# Patient Record
Sex: MALE | Race: WHITE | NOT HISPANIC OR LATINO | Employment: UNEMPLOYED | URBAN - METROPOLITAN AREA
[De-identification: names, ages, dates, MRNs, and addresses within clinical notes are randomized per-mention and may not be internally consistent; named-entity substitution may affect disease eponyms.]

---

## 2018-09-28 ENCOUNTER — HOSPITAL ENCOUNTER (EMERGENCY)
Facility: HOSPITAL | Age: 28
Discharge: HOME | End: 2018-09-28

## 2018-09-28 ENCOUNTER — APPOINTMENT (EMERGENCY)
Dept: RADIOLOGY | Facility: HOSPITAL | Age: 28
End: 2018-09-28

## 2018-09-28 VITALS
HEART RATE: 92 BPM | HEIGHT: 70 IN | DIASTOLIC BLOOD PRESSURE: 102 MMHG | WEIGHT: 280 LBS | RESPIRATION RATE: 18 BRPM | OXYGEN SATURATION: 97 % | SYSTOLIC BLOOD PRESSURE: 147 MMHG | BODY MASS INDEX: 40.09 KG/M2 | TEMPERATURE: 97.5 F

## 2018-09-28 DIAGNOSIS — I10 HYPERTENSION, UNSPECIFIED TYPE: ICD-10-CM

## 2018-09-28 DIAGNOSIS — J06.9 UPPER RESPIRATORY TRACT INFECTION: Primary | ICD-10-CM

## 2018-09-28 LAB
ALBUMIN SERPL BCP-MCNC: 4.4 G/DL (ref 3.5–5.7)
ALP SERPL-CCNC: 84 U/L (ref 40–150)
ALT SERPL W P-5'-P-CCNC: 57 U/L (ref 7–52)
ANION GAP SERPL CALCULATED.3IONS-SCNC: 6 MMOL/L (ref 4–13)
AST SERPL W P-5'-P-CCNC: 35 U/L (ref 13–39)
BASOPHILS # BLD AUTO: 0 THOUSANDS/ΜL (ref 0–0.1)
BASOPHILS NFR BLD AUTO: 1 % (ref 0–2)
BILIRUB SERPL-MCNC: 0.4 MG/DL (ref 0.2–1)
BUN SERPL-MCNC: 10 MG/DL (ref 7–25)
CALCIUM SERPL-MCNC: 10 MG/DL (ref 8.6–10.5)
CHLORIDE SERPL-SCNC: 105 MMOL/L (ref 98–107)
CO2 SERPL-SCNC: 29 MMOL/L (ref 21–31)
CREAT SERPL-MCNC: 0.75 MG/DL (ref 0.7–1.3)
EOSINOPHIL # BLD AUTO: 0.1 THOUSAND/ΜL (ref 0–0.61)
EOSINOPHIL NFR BLD AUTO: 1 % (ref 0–5)
ERYTHROCYTE [DISTWIDTH] IN BLOOD BY AUTOMATED COUNT: 13.4 % (ref 11.5–14.5)
GFR SERPL CREATININE-BSD FRML MDRD: 123 ML/MIN/1.73SQ M
GLUCOSE SERPL-MCNC: 104 MG/DL (ref 65–99)
GLUCOSE SERPL-MCNC: 98 MG/DL (ref 65–140)
HCT VFR BLD AUTO: 48 % (ref 36.5–49.3)
HGB BLD-MCNC: 16 G/DL (ref 14–18)
LYMPHOCYTES # BLD AUTO: 2.7 THOUSANDS/ΜL (ref 0.6–4.47)
LYMPHOCYTES NFR BLD AUTO: 35 % (ref 21–51)
MCH RBC QN AUTO: 31.6 PG (ref 26–34)
MCHC RBC AUTO-ENTMCNC: 33.3 G/DL (ref 31–37)
MCV RBC AUTO: 95 FL (ref 81–99)
MONOCYTES # BLD AUTO: 0.6 THOUSAND/ΜL (ref 0.17–1.22)
MONOCYTES NFR BLD AUTO: 8 % (ref 2–12)
NEUTROPHILS # BLD AUTO: 4.3 THOUSANDS/ΜL (ref 1.4–6.5)
NEUTS SEG NFR BLD AUTO: 56 % (ref 42–75)
NRBC BLD AUTO-RTO: 0 /100 WBCS
PLATELET # BLD AUTO: 248 THOUSANDS/UL (ref 149–390)
PMV BLD AUTO: 9.1 FL (ref 8.6–11.7)
POTASSIUM SERPL-SCNC: 4.6 MMOL/L (ref 3.5–5.5)
PROT SERPL-MCNC: 7.5 G/DL (ref 6.4–8.9)
RBC # BLD AUTO: 5.06 MILLION/UL (ref 4.3–5.9)
S PYO AG THROAT QL: NEGATIVE
SODIUM SERPL-SCNC: 140 MMOL/L (ref 134–143)
WBC # BLD AUTO: 7.7 THOUSAND/UL (ref 4.8–10.8)

## 2018-09-28 PROCEDURE — 82948 REAGENT STRIP/BLOOD GLUCOSE: CPT

## 2018-09-28 PROCEDURE — 99283 EMERGENCY DEPT VISIT LOW MDM: CPT

## 2018-09-28 PROCEDURE — 80053 COMPREHEN METABOLIC PANEL: CPT

## 2018-09-28 PROCEDURE — 85025 COMPLETE CBC W/AUTO DIFF WBC: CPT

## 2018-09-28 PROCEDURE — 71046 X-RAY EXAM CHEST 2 VIEWS: CPT

## 2018-09-28 PROCEDURE — 87430 STREP A AG IA: CPT

## 2018-09-28 PROCEDURE — 36415 COLL VENOUS BLD VENIPUNCTURE: CPT

## 2018-09-28 RX ORDER — ALBUTEROL SULFATE 90 UG/1
2 AEROSOL, METERED RESPIRATORY (INHALATION) 4 TIMES DAILY
Qty: 1 INHALER | Refills: 0 | Status: SHIPPED | OUTPATIENT
Start: 2018-09-28 | End: 2018-10-05

## 2018-09-28 NOTE — ED NOTES
Patient transported to X-ray ambulatory (pt request) with radiology staff        Izabella Garcia RN  09/28/18 [id number]

## 2018-09-28 NOTE — ED NOTES
Pt states he wants to be checked for bronchitis and also wishes to be checked for diabetes  "When I eat something like cake it makes my vision funny " Pt states diabetes runs in his family       [place], RN  09/28/18 [id number]

## 2018-09-28 NOTE — DISCHARGE INSTRUCTIONS
Upper Respiratory Infection   WHAT YOU NEED TO KNOW:   An upper respiratory infection is also called the common cold  It is an infection that can affect your nose, throat, ears, and sinuses  For healthy people, the common cold is usually not serious and does not need special treatment  Cold symptoms are usually worst for the first 3 to 5 days  Most people get better in 7 to 14 days  You may continue to cough for 2 to 3 weeks  Colds are caused by viruses and do not get better with antibiotics  DISCHARGE INSTRUCTIONS:   Return to the emergency department if:   · You have chest pain or trouble breathing  Contact your healthcare provider if:   · You have a fever over 102ºF (39°C)  · Your sore throat gets worse or you see white or yellow spots in your throat  · Your symptoms get worse after 3 to 5 days or your cold is not better in 14 days  · You have a rash anywhere on your skin  · You have large, tender lumps in your neck  · You have thick, green or yellow drainage from your nose  · You cough up thick yellow, green, or bloody mucus  · You have vomiting for more than 24 hours and cannot keep fluids down  · You have a bad earache  · You have questions or concerns about your condition or care  Medicines: You may need any of the following:  · Decongestants  help reduce nasal congestion and help you breathe more easily  If you take decongestant pills, they may make you feel restless or cause problems with your sleep  Do not use decongestant sprays for more than a few days  · Cough suppressants  help reduce coughing  Ask your healthcare provider which type of cough medicine is best for you  · NSAIDs , such as ibuprofen, help decrease swelling, pain, and fever  NSAIDs can cause stomach bleeding or kidney problems in certain people  If you take blood thinner medicine, always ask your healthcare provider if NSAIDs are safe for you   Always read the medicine label and follow directions  · Acetaminophen  decreases pain and fever  It is available without a doctor's order  Ask how much to take and how often to take it  Follow directions  Read the labels of all other medicines you are using to see if they also contain acetaminophen, or ask your doctor or pharmacist  Acetaminophen can cause liver damage if not taken correctly  Do not use more than 4 grams (4,000 milligrams) total of acetaminophen in one day  · Take your medicine as directed  Contact your healthcare provider if you think your medicine is not helping or if you have side effects  Tell him or her if you are allergic to any medicine  Keep a list of the medicines, vitamins, and herbs you take  Include the amounts, and when and why you take them  Bring the list or the pill bottles to follow-up visits  Carry your medicine list with you in case of an emergency  Follow up with your healthcare provider as directed:  Write down your questions so you remember to ask them during your visits  Self-care:   · Rest as much as possible  Slowly start to do more each day  · Drink more liquids as directed  Liquids will help thin and loosen mucus so you can cough it up  Liquids will also help prevent dehydration  Liquids that help prevent dehydration include water, fruit juice, and broth  Do not drink liquids that contain caffeine  Caffeine can increase your risk for dehydration  Ask your healthcare provider how much liquid to drink each day  · Soothe a sore throat  Gargle with warm salt water  This helps your sore throat feel better  Make salt water by dissolving ¼ teaspoon salt in 1 cup warm water  You may also suck on hard candy or throat lozenges  You may use a sore throat spray  · Use a humidifier or vaporizer  Use a cool mist humidifier or a vaporizer to increase air moisture in your home  This may make it easier for you to breathe and help decrease your cough  · Use saline nasal drops as directed    These help relieve congestion  · Apply petroleum-based jelly around the outside of your nostrils  This can decrease irritation from blowing your nose  · Do not smoke  Nicotine and other chemicals in cigarettes and cigars can make your symptoms worse  They can also cause infections such as bronchitis or pneumonia  Ask your healthcare provider for information if you currently smoke and need help to quit  E-cigarettes or smokeless tobacco still contain nicotine  Talk to your healthcare provider before you use these products  Prevent spreading your cold to others:   · Try to stay away from other people during the first 2 to 3 days of your cold when it is more easily spread  · Do not share food or drinks  · Do not share hand towels with household members  · Wash your hands often, especially after you blow your nose  Turn away from other people and cover your mouth and nose with a tissue when you sneeze or cough  © 2017 [employer]  Information is for End User's use only and may not be sold, redistributed or otherwise used for commercial purposes  All illustrations and images included in CareNotes® are the copyrighted property of [employer]  or [employer]  The above information is an  only  It is not intended as medical advice for individual conditions or treatments  Talk to your doctor, nurse or pharmacist before following any medical regimen to see if it is safe and effective for you  Upper Respiratory Infection   WHAT YOU NEED TO KNOW:   What is an upper respiratory infection? An upper respiratory infection is also called a common cold  It can affect your nose, throat, ears, and sinuses  What causes a cold? The common cold is caused by a virus  There are many different cold viruses, and each is contagious  This means the virus can be easily spread to another person when the sick person coughs or sneezes   The virus can also be spread if you touch something that a person with a cold has touched  You are more likely to get a cold in the winter  Your risk of getting a cold may be increased if you smoke cigarettes or have allergies, such as hay fever  What are the signs and symptoms of a cold? Cold symptoms are usually worst for the first 3 to 5 days  You may have any of the following:  · Runny or stuffy nose    · Sneezing and coughing    · Sore throat or hoarseness    · Red, watery, and sore eyes    · Fatigue     · Chills and fever    · Headache, body aches, or sore muscles  How is a cold treated? There is no cure for the common cold  Colds are caused by viruses and do not get better with antibiotics  Most people get better in 7 to 14 days  You may continue to cough for 2 to 3 weeks  The following may help decrease your symptoms:  · Decongestants  help reduce nasal congestion and help you breathe more easily  If you take decongestant pills, they may make you feel restless or cause problems with your sleep  Do not use decongestant sprays for more than a few days  · Cough suppressants  help reduce coughing  Ask your healthcare provider which type of cough medicine is best for you  · NSAIDs , such as ibuprofen, help decrease swelling, pain, and fever  NSAIDs can cause stomach bleeding or kidney problems in certain people  If you take blood thinner medicine, always ask your healthcare provider if NSAIDs are safe for you  Always read the medicine label and follow directions  · Acetaminophen  decreases pain and fever  It is available without a doctor's order  Ask how much to take and how often to take it  Follow directions  Read the labels of all other medicines you are using to see if they also contain acetaminophen, or ask your doctor or pharmacist  Acetaminophen can cause liver damage if not taken correctly  Do not use more than 4 grams (4,000 milligrams) total of acetaminophen in one day  How can I manage my cold? · Rest as much as possible  Slowly start to do more each day  · Drink more liquids as directed  Liquids will help thin and loosen mucus so you can cough it up  Liquids will also help prevent dehydration  Liquids that help prevent dehydration include water, fruit juice, and broth  Do not drink liquids that contain caffeine  Caffeine can increase your risk for dehydration  Ask your healthcare provider how much liquid to drink each day  · Soothe a sore throat  Gargle with warm salt water  This helps your sore throat feel better  Make salt water by dissolving ¼ teaspoon salt in 1 cup warm water  You may also suck on hard candy or throat lozenges  You may use a sore throat spray  · Use a humidifier or vaporizer  Use a cool mist humidifier or a vaporizer to increase air moisture in your home  This may make it easier for you to breathe and help decrease your cough  · Use saline nasal drops as directed  These help relieve congestion  · Apply petroleum-based jelly around the outside of your nostrils  This can decrease irritation from blowing your nose  · Do not smoke  Nicotine and other chemicals in cigarettes and cigars can make your symptoms worse  They can also cause infections such as bronchitis or pneumonia  Ask your healthcare provider for information if you currently smoke and need help to quit  E-cigarettes or smokeless tobacco still contain nicotine  Talk to your healthcare provider before you use these products  What can I do to prevent the spread of the common cold? · Try to stay away from other people during the first 2 to 3 days of your cold when it is more easily spread  · Do not share food or drinks  · Do not share hand towels with household members  · Wash your hands often, especially after you blow your nose  Turn away from other people and cover your mouth and nose with a tissue when you sneeze or cough  When should I seek immediate care?    · You have chest pain or trouble breathing  When should I contact my healthcare provider? · You have a fever over 102ºF (39ºC)  · Your sore throat gets worse or you see white or yellow spots in your throat  · Your symptoms get worse after 3 to 5 days or your cold is not better in 14 days  · You have a rash anywhere on your skin  · You have large, tender lumps in your neck  · You have thick, green, or yellow drainage from your nose  · You cough up thick yellow, green, or bloody mucus  · You are vomiting for more than 24 hours and cannot keep fluids down  · You have a bad earache  · You have questions or concerns about your condition or care  CARE AGREEMENT:   You have the right to help plan your care  Learn about your health condition and how it may be treated  Discuss treatment options with your caregivers to decide what care you want to receive  You always have the right to refuse treatment  The above information is an  only  It is not intended as medical advice for individual conditions or treatments  Talk to your doctor, nurse or pharmacist before following any medical regimen to see if it is safe and effective for you  © 2017 [address] [facility] Information is for End User's use only and may not be sold, redistributed or otherwise used for commercial purposes  All illustrations and images included in CareNotes® are the copyrighted property of [employer]  or [employer]  Hypertension   WHAT YOU NEED TO KNOW:   Hypertension is high blood pressure (BP)  Your BP is the force of your blood moving against the walls of your arteries  Normal BP is less than 120/80  Prehypertension is between 120/80 and 139/89  Hypertension is 140/90 or higher  Hypertension causes your BP to get so high that your heart has to work much harder than normal  This can damage your heart  You can control hypertension with a healthy lifestyle or medicines   A controlled blood pressure helps protect your organs, such as your heart, lungs, brain, and kidneys  DISCHARGE INSTRUCTIONS:   Call 911 for any of the following:   · You have discomfort in your chest that feels like squeezing, pressure, fullness, or pain  · You become confused or have difficulty speaking  · You suddenly feel lightheaded or have trouble breathing  · You have pain or discomfort in your back, neck, jaw, stomach, or arm  Return to the emergency department if:   · You have a severe headache or vision loss  · You have weakness in an arm or leg  Contact your healthcare provider if:   · You feel faint, dizzy, confused, or drowsy  · You have been taking your BP medicine and your BP is still higher than your healthcare provider says it should be  · You have questions or concerns about your condition or care  Medicines: You may  need any of the following:  · Medicine  may be used to help lower your BP  You may need more than one type of medicine  Take the medicine exactly as directed  · Diuretics  help decrease extra fluid that collects in your body  This will help lower your BP  You may urinate more often while you take this medicine  · Cholesterol medicine  helps lower your cholesterol level  A low cholesterol level helps prevent heart disease and makes it easier to control your blood pressure  · Take your medicine as directed  Contact your healthcare provider if you think your medicine is not helping or if you have side effects  Tell him or her if you are allergic to any medicine  Keep a list of the medicines, vitamins, and herbs you take  Include the amounts, and when and why you take them  Bring the list or the pill bottles to follow-up visits  Carry your medicine list with you in case of an emergency  Follow up with your healthcare provider as directed: You will need to return to have your BP checked and to have other lab tests done  Write down your questions so you remember to ask them during your visits  Manage hypertension:  Talk with your healthcare provider about these and other ways to manage hypertension:  · Check your BP at home  Sit and rest for 5 minutes before you take your BP  Extend your arm and support it on a flat surface  Your arm should be at the same level as your heart  Follow the directions that came with your BP monitor  If possible, take at least 2 BP readings each time  Take your BP at least twice a day at the same times each day, such as morning and evening  Keep a record of your BP readings and bring it to your follow-up visits  Ask your healthcare provider what your BP should be  · Limit sodium (salt) as directed  Too much sodium can affect your fluid balance  Check labels to find low-sodium or no-salt-added foods  Some low-sodium foods use potassium salts for flavor  Too much potassium can also cause health problems  Your healthcare provider will tell you how much sodium and potassium are safe for you to have in a day  He or she may recommend that you limit sodium to 2,300 mg a day  · Follow the meal plan recommended by your healthcare provider  A dietitian or your provider can give you more information on low-sodium plans or the DASH (Dietary Approaches to Stop Hypertension) eating plan  The DASH plan is low in sodium, unhealthy fats, and total fat  It is high in potassium, calcium, and fiber  · Exercise to maintain a healthy weight  Exercise at least 30 minutes per day, on most days of the week  This will help decrease your blood pressure  Ask your healthcare provider about the best exercise plan for you  · Decrease stress  This may help lower your BP  Learn ways to relax, such as deep breathing or listening to music  · Limit alcohol  Women should limit alcohol to 1 drink a day  Men should limit alcohol to 2 drinks a day  A drink of alcohol is 12 ounces of beer, 5 ounces of wine, or 1½ ounces of liquor  · Do not smoke    Nicotine and other chemicals in cigarettes and cigars can increase your BP and also cause lung damage  Ask your healthcare provider for information if you currently smoke and need help to quit  E-cigarettes or smokeless tobacco still contain nicotine  Talk to your healthcare provider before you use these products  · Manage any other health conditions you have  Health conditions such as diabetes can increase your risk for hypertension  Follow your healthcare provider's instructions and take all your medicines as directed  © 2017 [facility] Information is for End User's use only and may not be sold, redistributed or otherwise used for commercial purposes  All illustrations and images included in CareNotes® are the copyrighted property of [employer]  or [employer]  The above information is an  only  It is not intended as medical advice for individual conditions or treatments  Talk to your doctor, nurse or pharmacist before following any medical regimen to see if it is safe and effective for you  Hypertension   WHAT YOU NEED TO KNOW:   What is hypertension? Hypertension is high blood pressure (BP)  Your BP is the force of your blood moving against the walls of your arteries  Normal BP is less than 120/80  Prehypertension is between 120/80 and 139/89  Hypertension is 140/90 or higher  Hypertension causes your BP to get so high that your heart has to work much harder than normal  This can damage your heart  You can control hypertension with a healthy lifestyle or medicines  A controlled blood pressure helps protect your organs, such as your heart, lungs, brain, and kidneys  What causes hypertension? The cause of hypertension may not be known  This type of hypertension is called essential or primary hypertension  Hypertension can sometimes be caused by other medical conditions, such as kidney disease  This type of hypertension is called secondary hypertension     What increases my risk for hypertension? · Age older than 54 years (men)    · Age older than 72 years (women)    · A family history of hypertension or heart disease     · Obesity or lack of exercise     · Too many high-sodium foods    · Stress     · Use of tobacco, alcohol, or illegal drugs     · A medical condition, such as diabetes, kidney disease, thyroid disease, or adrenal gland disorder     · Certain medicines, such as steroids or birth control pills  What are the signs and symptoms of hypertension? You may have no signs or symptoms, or you may have any of the following:  · Headache     · Blurred vision     · Chest pain     · Dizziness or weakness     · Trouble breathing    · Nosebleeds  How is hypertension diagnosed? Your healthcare provider will ask about your symptoms and the medicines you take  He or she will also ask if you have a family history of high blood pressure and about any health conditions you have  He or she will also check your blood pressure and weight and examine your heart, lungs, and eyes  You may need any of the following tests:  · Blood tests  may help healthcare providers find the cause of your hypertension  Blood tests can also help find other health problems caused by hypertension  · Urine tests  will be done to check your kidney function  Kidney problems can increase your risk for hypertension  How is hypertension treated? Your healthcare provider will recommend lifestyle changes to lower your BP  You may also need the following medicines:  · Medicine  may be used to help lower your BP  You may need more than one type of medicine  Take the medicine exactly as directed  · Diuretics  help decrease extra fluid that collects in your body  This will help lower your BP  You may urinate more often while you take this medicine  · Cholesterol medicine  helps lower your cholesterol level   A low cholesterol level helps prevent heart disease and makes it easier to control your blood pressure  What can I do to manage hypertension? Talk with your healthcare provider about these and other ways to manage hypertension:  · Check your BP at home  Sit and rest for 5 minutes before you take your BP  Extend your arm and support it on a flat surface  Your arm should be at the same level as your heart  Follow the directions that came with your BP monitor  If possible, take at least 2 BP readings each time  Take your BP at least twice a day at the same times each day, such as morning and evening  Keep a record of your BP readings and bring it to your follow-up visits  Ask your healthcare provider what your BP should be  · Limit sodium (salt) as directed  Too much sodium can affect your fluid balance  Check labels to find low-sodium or no-salt-added foods  Some low-sodium foods use potassium salts for flavor  Too much potassium can also cause health problems  Your healthcare provider will tell you how much sodium and potassium are safe for you to have in a day  He or she may recommend that you limit sodium to 2,300 mg a day  · Follow the meal plan recommended by your healthcare provider  A dietitian or your provider can give you more information on low-sodium plans or the DASH (Dietary Approaches to Stop Hypertension) eating plan  The DASH plan is low in sodium, unhealthy fats, and total fat  It is high in potassium, calcium, and fiber  · Exercise to maintain a healthy weight  Exercise at least 30 minutes per day, on most days of the week  This will help decrease your blood pressure  Ask your healthcare provider about the best exercise plan for you  · Decrease stress  This may help lower your BP  Learn ways to relax, such as deep breathing or listening to music  · Limit alcohol  Women should limit alcohol to 1 drink a day  Men should limit alcohol to 2 drinks a day  A drink of alcohol is 12 ounces of beer, 5 ounces of wine, or 1½ ounces of liquor      · Do not smoke   Nicotine and other chemicals in cigarettes and cigars can increase your BP and also cause lung damage  Ask your healthcare provider for information if you currently smoke and need help to quit  E-cigarettes or smokeless tobacco still contain nicotine  Talk to your healthcare provider before you use these products  · Manage any other health conditions you have  Health conditions such as diabetes can increase your risk for hypertension  Follow your healthcare provider's instructions and take all your medicines as directed  Call 911 for any of the following:   · You have discomfort in your chest that feels like squeezing, pressure, fullness, or pain  · You become confused or have difficulty speaking  · You suddenly feel lightheaded or have trouble breathing  · You have pain or discomfort in your back, neck, jaw, stomach, or arm  When should I seek immediate care? · You have a severe headache or vision loss  · You have weakness in an arm or leg  When should I contact my healthcare provider? · You feel faint, dizzy, confused, or drowsy  · You have been taking your BP medicine and your BP is still higher than your healthcare provider says it should be  · You have questions or concerns about your condition or care  CARE AGREEMENT:   You have the right to help plan your care  Learn about your health condition and how it may be treated  Discuss treatment options with your caregivers to decide what care you want to receive  You always have the right to refuse treatment  The above information is an  only  It is not intended as medical advice for individual conditions or treatments  Talk to your doctor, nurse or pharmacist before following any medical regimen to see if it is safe and effective for you  © 2017 [employer] Information is for End User's use only and may not be sold, redistributed or otherwise used for commercial purposes   All illustrations and images included in CareNotes® are the copyrighted property of [employer]  or [employer]

## 2018-09-28 NOTE — ED PROVIDER NOTES
History  Chief Complaint   Patient presents with    MAYRA Gallagher is a 33-year-old male who came to the emergency department to nonproductive cough with mild shortness of breath that started 4 days prior to arrival   Patient also complains of sore throat but has no fever or chills, difficulty swallowing, or pain on swallowing  He also has occasional nasal congestion  Patient states that he is concerned about having diabetes and is requesting for a screening for such  Cough   Cough characteristics:  Non-productive  Sputum characteristics:  Nondescript  Severity:  Mild  Onset quality:  Gradual  Duration:  4 days  Timing:  Intermittent  Progression:  Unchanged  Chronicity:  New  Smoker: no    Context: not animal exposure, not exposure to allergens, not fumes, not occupational exposure, not sick contacts, not smoke exposure, not upper respiratory infection, not weather changes and not with activity    Relieved by:  Nothing  Worsened by:  Nothing  Ineffective treatments:  None tried  Associated symptoms: shortness of breath and sore throat    Associated symptoms: no chest pain, no chills, no diaphoresis, no ear fullness, no ear pain, no eye discharge, no fever, no headaches, no myalgias, no rash, no rhinorrhea, no sinus congestion, no weight loss and no wheezing    Shortness of breath:     Severity:  Mild    Onset quality:  Gradual    Duration:  4 days    Timing:  Intermittent    Progression:  Unchanged  Sore throat:     Severity:  Mild    Onset quality:  Gradual    Duration:  3 days    Timing:  Intermittent    Progression:  Unchanged  Risk factors: no chemical exposure, no recent infection and no recent travel        None       Past Medical History:   Diagnosis Date    Asthma        Past Surgical History:   Procedure Laterality Date    ANKLE SURGERY Right     pins and plate       History reviewed  No pertinent family history  I have reviewed and agree with the history as documented      Social History   Substance Use Topics    Smoking status: Never Smoker    Smokeless tobacco: Never Used    Alcohol use Yes      Comment: socially        Review of Systems   Constitutional: Negative for chills, diaphoresis, fever and weight loss  HENT: Positive for sore throat  Negative for ear pain and rhinorrhea  Eyes: Negative for discharge  Respiratory: Positive for cough and shortness of breath  Negative for wheezing  Cardiovascular: Negative for chest pain  Gastrointestinal: Negative  Endocrine: Negative  Genitourinary: Negative  Musculoskeletal: Negative for myalgias  Skin: Negative for rash  Allergic/Immunologic: Negative  Neurological: Negative for headaches  Hematological: Negative  Psychiatric/Behavioral: Negative  Physical Exam  Physical Exam   Constitutional: He is oriented to person, place, and time  He appears well-developed and well-nourished  No distress  HENT:   Head: Normocephalic and atraumatic  Right Ear: External ear normal    Left Ear: External ear normal    Nose: Nose normal    Mouth/Throat: Oropharynx is clear and moist  No oropharyngeal exudate  Eyes: Pupils are equal, round, and reactive to light  Conjunctivae and EOM are normal  Right eye exhibits no discharge  Left eye exhibits no discharge  No scleral icterus  Neck: Normal range of motion  Neck supple  No tracheal deviation present  No thyromegaly present  Cardiovascular: Normal rate, regular rhythm, normal heart sounds and intact distal pulses  Pulmonary/Chest: Effort normal and breath sounds normal  No respiratory distress  Abdominal: Soft  Bowel sounds are normal  He exhibits no distension  There is no tenderness  Musculoskeletal: Normal range of motion  He exhibits no edema, tenderness or deformity  Lymphadenopathy:     He has no cervical adenopathy  Neurological: He is alert and oriented to person, place, and time  No cranial nerve deficit or sensory deficit   He exhibits normal muscle tone  Coordination normal    Skin: Skin is warm and dry  No rash noted  He is not diaphoretic  No erythema  No pallor  Psychiatric: He has a normal mood and affect  His behavior is normal  Judgment and thought content normal    Nursing note and vitals reviewed  Vital Signs  ED Triage Vitals [09/28/18 0753]   Temperature Pulse Respirations Blood Pressure SpO2   97 5 °F (36 4 °C) 84 18 (!) 166/103 98 %      Temp Source Heart Rate Source Patient Position - Orthostatic VS BP Location FiO2 (%)   Temporal Monitor Sitting Left arm --      Pain Score       No Pain           Vitals:    09/28/18 0753   BP: (!) 166/103   Pulse: 84   Patient Position - Orthostatic VS: Sitting       Visual Acuity      ED Medications  Medications - No data to display    Diagnostic Studies  Results Reviewed     Procedure Component Value Units Date/Time    Comprehensive metabolic panel [[id number]]  (Abnormal) Collected:  09/28/18 0811    Lab Status:  Final result Specimen:  Blood from Arm, Right Updated:  09/28/18 0841     Sodium 140 mmol/L      Potassium 4 6 mmol/L      Chloride 105 mmol/L      CO2 29 mmol/L      ANION GAP 6 mmol/L      BUN 10 mg/dL      Creatinine 0 75 mg/dL      Glucose 104 (H) mg/dL      Calcium 10 0 mg/dL      AST 35 U/L      ALT 57 (H) U/L      Alkaline Phosphatase 84 U/L      Total Protein 7 5 g/dL      Albumin 4 4 g/dL      Total Bilirubin 0 40 mg/dL      eGFR 123 ml/min/1 73sq m     Narrative:         National Kidney Disease Education Program recommendations are as follows:  GFR calculation is accurate only with a steady state creatinine  Chronic Kidney disease less than 60 ml/min/1 73 sq  meters  Kidney failure less than 15 ml/min/1 73 sq  meters      Rapid Strep A Screen Only, Adults [[id number]]  (Normal) Collected:  09/28/18 0811    Lab Status:  Final result Specimen:  Throat from Throat Updated:  09/28/18 0834     Rapid Strep A Screen Negative    CBC and differential [[id number]] Collected:  09/28/18 8277 Lab Status:  Final result Specimen:  Blood from Arm, Right Updated:  09/28/18 0829     WBC 7 70 Thousand/uL      RBC 5 06 Million/uL      Hemoglobin 16 0 g/dL      Hematocrit 48 0 %      MCV 95 fL      MCH 31 6 pg      MCHC 33 3 g/dL      RDW 13 4 %      MPV 9 1 fL      Platelets 777 Thousands/uL      nRBC 0 /100 WBCs      Neutrophils Relative 56 %      Lymphocytes Relative 35 %      Monocytes Relative 8 %      Eosinophils Relative 1 %      Basophils Relative 1 %      Neutrophils Absolute 4 30 Thousands/µL      Lymphocytes Absolute 2 70 Thousands/µL      Monocytes Absolute 0 60 Thousand/µL      Eosinophils Absolute 0 10 Thousand/µL      Basophils Absolute 0 00 Thousands/µL     Fingerstick Glucose (POCT) [[id number]]  (Normal) Collected:  09/28/18 0803    Lab Status:  Final result Updated:  09/28/18 0805     POC Glucose 98 mg/dl                  XR chest 2 views   ED Interpretation by Gale Garay MD (09/28 4469)   No infiltrate                 Procedures  Procedures       Phone Contacts  ED Phone Contact    ED Course  ED Course as of Sep 28 0917   Fri Sep 28, 2018   0911 Patient is resting comfortably on the stretcher and not in any distress  Discussed with him the results of his tests including the chest x-ray  MDM    CritCare Time    Disposition  Final diagnoses:   Upper respiratory tract infection   Hypertension, unspecified type     Time reflects when diagnosis was documented in both MDM as applicable and the Disposition within this note     Time User Action Codes Description Comment    9/28/2018  9:13 AM Gio Plata Add [J06 9] Upper respiratory tract infection     9/28/2018  9:14 AM Theresa Candelario Add [I10] Hypertension, unspecified type       ED Disposition     ED Disposition Condition Comment    Discharge  Jan Calix Ii [address] discharge to home/self care      Condition at discharge: Good        Follow-up Information     Follow up With Specialties Details Why Contact Info    Primary Care Physician  In 3 days            Patient's Medications   Discharge Prescriptions    ALBUTEROL (PROVENTIL HFA,VENTOLIN HFA) 90 MCG/ACT INHALER    Inhale 2 puffs 4 (four) times a day for 7 days As needed for shortness of breath  Start Date: 9/28/2018 End Date: 10/5/2018       Order Dose: 2 puffs       Quantity: 1 Inhaler    Refills: 0     No discharge procedures on file      ED Provider  Electronically Signed by           Fabian Montemayor MD  09/28/18 7679

## 2020-10-19 ENCOUNTER — HOSPITAL ENCOUNTER (EMERGENCY)
Facility: HOSPITAL | Age: 30
Discharge: HOME | End: 2020-10-19
Attending: EMERGENCY MEDICINE

## 2020-10-19 ENCOUNTER — APPOINTMENT (EMERGENCY)
Dept: RADIOLOGY | Facility: HOSPITAL | Age: 30
End: 2020-10-19

## 2020-10-19 VITALS
OXYGEN SATURATION: 99 % | SYSTOLIC BLOOD PRESSURE: 160 MMHG | HEIGHT: 70 IN | BODY MASS INDEX: 45.1 KG/M2 | WEIGHT: 315 LBS | DIASTOLIC BLOOD PRESSURE: 92 MMHG | RESPIRATION RATE: 18 BRPM | TEMPERATURE: 97.3 F | HEART RATE: 85 BPM

## 2020-10-19 DIAGNOSIS — S63.501A SPRAIN AND STRAIN OF RIGHT WRIST: Primary | ICD-10-CM

## 2020-10-19 DIAGNOSIS — S46.911A ELBOW STRAIN, RIGHT, INITIAL ENCOUNTER: ICD-10-CM

## 2020-10-19 DIAGNOSIS — S66.911A SPRAIN AND STRAIN OF RIGHT WRIST: Primary | ICD-10-CM

## 2020-10-19 DIAGNOSIS — S46.011A STRAIN OF RIGHT ROTATOR CUFF CAPSULE, INITIAL ENCOUNTER: ICD-10-CM

## 2020-10-19 PROBLEM — S56.911A ELBOW STRAIN, RIGHT, INITIAL ENCOUNTER: Status: ACTIVE | Noted: 2020-10-19

## 2020-10-19 PROCEDURE — 99284 EMERGENCY DEPT VISIT MOD MDM: CPT | Performed by: EMERGENCY MEDICINE

## 2020-10-19 PROCEDURE — 99283 EMERGENCY DEPT VISIT LOW MDM: CPT

## 2020-10-19 PROCEDURE — 73080 X-RAY EXAM OF ELBOW: CPT

## 2020-10-19 PROCEDURE — 73110 X-RAY EXAM OF WRIST: CPT

## 2020-10-19 PROCEDURE — 73030 X-RAY EXAM OF SHOULDER: CPT

## 2020-10-19 RX ORDER — NAPROXEN 500 MG/1
500 TABLET ORAL 2 TIMES DAILY WITH MEALS
Qty: 10 TABLET | Refills: 0 | Status: SHIPPED | OUTPATIENT
Start: 2020-10-19 | End: 2020-10-24

## 2020-10-19 RX ORDER — ACETAMINOPHEN 325 MG/1
650 TABLET ORAL ONCE
Status: COMPLETED | OUTPATIENT
Start: 2020-10-19 | End: 2020-10-19

## 2020-10-19 RX ORDER — NAPROXEN 500 MG/1
500 TABLET ORAL ONCE
Status: COMPLETED | OUTPATIENT
Start: 2020-10-19 | End: 2020-10-19

## 2020-10-19 RX ADMIN — ACETAMINOPHEN 650 MG: 325 TABLET ORAL at 01:01

## 2020-10-19 RX ADMIN — NAPROXEN 500 MG: 500 TABLET ORAL at 01:01

## 2022-06-17 ENCOUNTER — HOSPITAL ENCOUNTER (EMERGENCY)
Facility: HOSPITAL | Age: 32
Discharge: HOME | End: 2022-06-17
Attending: EMERGENCY MEDICINE | Admitting: EMERGENCY MEDICINE

## 2022-06-17 VITALS
DIASTOLIC BLOOD PRESSURE: 95 MMHG | TEMPERATURE: 98.1 F | HEART RATE: 84 BPM | RESPIRATION RATE: 16 BRPM | SYSTOLIC BLOOD PRESSURE: 142 MMHG | OXYGEN SATURATION: 98 %

## 2022-06-17 DIAGNOSIS — K64.4 EXTERNAL HEMORRHOID, BLEEDING: Primary | ICD-10-CM

## 2022-06-17 PROCEDURE — 99284 EMERGENCY DEPT VISIT MOD MDM: CPT

## 2022-06-17 PROCEDURE — 99282 EMERGENCY DEPT VISIT SF MDM: CPT | Performed by: EMERGENCY MEDICINE

## 2022-06-17 RX ORDER — HYDROCORTISONE 25 MG/G
CREAM TOPICAL 2 TIMES DAILY
Qty: 28 G | Refills: 0 | Status: SHIPPED | OUTPATIENT
Start: 2022-06-17

## 2022-06-17 RX ORDER — POLYETHYLENE GLYCOL 3350 17 G/17G
17 POWDER, FOR SOLUTION ORAL DAILY
Qty: 850 G | Refills: 0 | Status: SHIPPED | OUTPATIENT
Start: 2022-06-17

## 2022-06-17 NOTE — ED PROVIDER NOTES
History  Chief Complaint   Patient presents with    Black or Bloody Stool     Pt states burning in rectum and blood when wiping  68-year-old male presenting with 2 episodes of rectal bleeding  He states he felt a scratchy and itchy sensation when he was having the last 2 bowel movements  As he white noted small streaks of blood on the toilet paper  He states she drank orange juice 2 nights ago and ever since then he has had difficulty with his bowels  No rectal trauma or injury  He does states he is occasionally straining with his bowel movements  Did have 1 episode of a anal fissure approximately 8 years ago  Patient is not immunocompromised  Only has had 2 episodes during the bowel movement  Black or Bloody Stool  Associated symptoms: no abdominal pain, no dizziness, no fever, no light-headedness and no vomiting        Prior to Admission Medications   Prescriptions Last Dose Informant Patient Reported? Taking?   naproxen (NAPROSYN) 500 mg tablet   No No   Sig: Take 1 tablet (500 mg total) by mouth 2 (two) times a day with meals for 10 doses      Facility-Administered Medications: None       Past Medical History:   Diagnosis Date    Asthma        Past Surgical History:   Procedure Laterality Date    ANKLE SURGERY Right     pins and plate       History reviewed  No pertinent family history  I have reviewed and agree with the history as documented  E-Cigarette/Vaping    E-Cigarette Use Never User      E-Cigarette/Vaping Substances    Nicotine No     THC No     CBD No     Flavoring No     Other No     Unknown No      Social History     Tobacco Use    Smoking status: Never Smoker    Smokeless tobacco: Never Used   Vaping Use    Vaping Use: Never used   Substance Use Topics    Alcohol use: Yes     Comment: socially    Drug use: No       Review of Systems   Constitutional: Negative for activity change, appetite change, chills and fever     HENT: Negative for ear pain, hearing loss, rhinorrhea, sneezing, sore throat and trouble swallowing  Eyes: Negative for pain and visual disturbance  Respiratory: Negative for cough, choking, chest tightness, shortness of breath, wheezing and stridor  Cardiovascular: Negative for chest pain, palpitations and leg swelling  Gastrointestinal: Positive for anal bleeding and blood in stool  Negative for abdominal pain, constipation, diarrhea, nausea and vomiting  Genitourinary: Negative for difficulty urinating, dysuria, frequency, hematuria and testicular pain  Musculoskeletal: Negative for arthralgias, back pain, gait problem and neck pain  Skin: Negative for color change and rash  Allergic/Immunologic: Negative for immunocompromised state  Neurological: Negative for dizziness, seizures, syncope, speech difficulty, weakness, light-headedness, numbness and headaches  Psychiatric/Behavioral: Negative for confusion  The patient is not nervous/anxious  All other systems reviewed and are negative  Physical Exam  Physical Exam  Vitals and nursing note reviewed  Constitutional:       General: He is not in acute distress  Appearance: Normal appearance  He is well-developed  He is obese  He is not ill-appearing, toxic-appearing or diaphoretic  HENT:      Head: Normocephalic and atraumatic  Nose: Nose normal       Mouth/Throat:      Mouth: Mucous membranes are moist       Pharynx: No oropharyngeal exudate  Eyes:      General: No scleral icterus  Extraocular Movements: Extraocular movements intact  Conjunctiva/sclera: Conjunctivae normal    Cardiovascular:      Rate and Rhythm: Normal rate and regular rhythm  Heart sounds: Normal heart sounds  No murmur heard  Pulmonary:      Effort: Pulmonary effort is normal  No respiratory distress  Breath sounds: Normal breath sounds  No wheezing or rales  Chest:      Chest wall: No tenderness  Abdominal:      General: Bowel sounds are normal  There is no distension  Palpations: Abdomen is soft  There is no mass  Tenderness: There is no abdominal tenderness  There is no right CVA tenderness, left CVA tenderness, guarding or rebound  Genitourinary:     Rectum: Guaiac result negative  External hemorrhoid present  Musculoskeletal:         General: No deformity  Normal range of motion  Cervical back: Normal range of motion and neck supple  Right lower leg: No edema  Left lower leg: No edema  Lymphadenopathy:      Cervical: No cervical adenopathy  Skin:     General: Skin is warm and dry  Capillary Refill: Capillary refill takes less than 2 seconds  Findings: No erythema, lesion or rash  Neurological:      General: No focal deficit present  Mental Status: He is alert and oriented to person, place, and time  Motor: No abnormal muscle tone     Psychiatric:         Mood and Affect: Mood normal          Behavior: Behavior normal          Vital Signs  ED Triage Vitals [06/17/22 1612]   Temperature Pulse Respirations Blood Pressure SpO2   98 1 °F (36 7 °C) 84 16 142/95 98 %      Temp Source Heart Rate Source Patient Position - Orthostatic VS BP Location FiO2 (%)   Temporal Monitor Sitting Left arm --      Pain Score       --           Vitals:    06/17/22 1612   BP: 142/95   Pulse: 84   Patient Position - Orthostatic VS: Sitting         Visual Acuity      ED Medications  Medications - No data to display    Diagnostic Studies  Results Reviewed     None                 No orders to display              Procedures  Procedures         ED Course                                             MDM    Disposition  Final diagnoses:   External hemorrhoid, bleeding     Time reflects when diagnosis was documented in both MDM as applicable and the Disposition within this note     Time User Action Codes Description Comment    6/17/2022  4:30 PM Josiah Ivey [K64 4] External hemorrhoid, bleeding       ED Disposition     ED Disposition Discharge    Condition   Stable    Date/Time   Fri Jun 17, 2022  4:30 PM    Comment   Gabbiju Alvarado discharge to home/self care  Follow-up Information     Follow up With Specialties Details Why Contact Info Additional [facility] Schedule an appointment as soon as possible for a visit   [address] [address]  Celio Allen [facility] Tiffany Bernard [facility], [place], [place], [address] [place] [address] 3          Discharge Medication List as of 6/17/2022  4:32 PM      START taking these medications    Details   hydrocortisone (ANUSOL-HC) 2 5 % rectal cream Apply topically 2 (two) times a day, Starting Fri 6/17/2022, Normal      polyethylene glycol (GLYCOLAX) 17 GM/SCOOP powder Take 17 g by mouth daily Use twice daily until condition resolves, then once daily x1 week and then as needed thereafter, Starting Fri 6/17/2022, Normal         CONTINUE these medications which have NOT CHANGED    Details   naproxen (NAPROSYN) 500 mg tablet Take 1 tablet (500 mg total) by mouth 2 (two) times a day with meals for 10 doses, Starting Mon 10/19/2020, Until Sat 10/24/2020, Normal             No discharge procedures on file      PDMP Review     None          ED Provider  Electronically Signed by           Jean Pritchard DO  06/17/22 0290

## 2023-02-28 ENCOUNTER — OFFICE VISIT (OUTPATIENT)
Dept: URGENT CARE | Facility: MEDICAL CENTER | Age: 33
End: 2023-02-28

## 2023-02-28 VITALS
OXYGEN SATURATION: 96 % | TEMPERATURE: 100.1 F | HEIGHT: 68 IN | SYSTOLIC BLOOD PRESSURE: 122 MMHG | BODY MASS INDEX: 39.4 KG/M2 | HEART RATE: 102 BPM | RESPIRATION RATE: 20 BRPM | DIASTOLIC BLOOD PRESSURE: 90 MMHG | WEIGHT: 260 LBS

## 2023-02-28 DIAGNOSIS — U07.1 COVID-19: Primary | ICD-10-CM

## 2023-02-28 LAB
GLUCOSE SERPL-MCNC: 83 MG/DL (ref 65–140)
SARS-COV-2 AG UPPER RESP QL IA: POSITIVE
VALID CONTROL: ABNORMAL

## 2023-02-28 NOTE — PROGRESS NOTES
[facility]        NAME: Wally Anderson is a 27 y o  male  : 1990    MRN: [id number]  DATE: 2023  TIME: 4:00 PM    Assessment and Plan   COVID-19 [U07 1]  1  COVID-19  Poct Covid 19 Rapid Antigen Test            Patient Instructions       Follow up with PCP in 3-5 days  Proceed to  ER if symptoms worsen  Chief Complaint     Chief Complaint   Patient presents with   • Medical Problem     Pt  C/o lightheaded and dizziness the past 2 days, exposed to covid and wants to have blood pressure taken and blood sugar checked, pt  Has no known blood pressure or diabetic history          History of Present Illness       Patient is a 33 y/o/m presenting to care Now with dizziness, lightheadedness x 2 days  Patient was exposed to someone w/ Covid-19   +Headache and runny nose  Patient reports dizzy w/ standing but not at rerst     Dizziness  This is a new problem  The current episode started in the past 7 days  The problem occurs constantly  The problem has been gradually worsening  Associated symptoms include congestion, a fever and headaches  Pertinent negatives include no abdominal pain, arthralgias, chest pain, chills, coughing, rash, sore throat or vomiting  Review of Systems   Review of Systems   Constitutional: Positive for fever  Negative for chills  HENT: Positive for congestion and rhinorrhea  Negative for ear pain and sore throat  Eyes: Negative for pain and visual disturbance  Respiratory: Negative for cough and shortness of breath  Cardiovascular: Negative for chest pain and palpitations  Gastrointestinal: Negative for abdominal pain and vomiting  Genitourinary: Negative for dysuria and hematuria  Musculoskeletal: Negative for arthralgias and back pain  Skin: Negative for color change and rash  Neurological: Positive for dizziness and headaches  Negative for seizures and syncope  All other systems reviewed and are negative          Current Medications Current Outpatient Medications:   •  hydrocortisone (ANUSOL-HC) 2 5 % rectal cream, Apply topically 2 (two) times a day (Patient not taking: Reported on 2/28/2023), Disp: 28 g, Rfl: 0  •  naproxen (NAPROSYN) 500 mg tablet, Take 1 tablet (500 mg total) by mouth 2 (two) times a day with meals for 10 doses, Disp: 10 tablet, Rfl: 0  •  polyethylene glycol (GLYCOLAX) 17 GM/SCOOP powder, Take 17 g by mouth daily Use twice daily until condition resolves, then once daily x1 week and then as needed thereafter (Patient not taking: Reported on 2/28/2023), Disp: 850 g, Rfl: 0    Current Allergies     Allergies as of 02/28/2023   • (No Known Allergies)            The following portions of the patient's history were reviewed and updated as appropriate: allergies, current medications, past family history, past medical history, past social history, past surgical history and problem list      Past Medical History:   Diagnosis Date   • Asthma        Past Surgical History:   Procedure Laterality Date   • ANKLE SURGERY Right     pins and plate       History reviewed  No pertinent family history  Medications have been verified  Objective   /90   Pulse 102   Temp 100 1 °F (37 8 °C)   Resp 20   Ht 5' 8" (1 727 m)   Wt 118 kg (260 lb)   SpO2 96%   BMI 39 53 kg/m²   No LMP for male patient  Physical Exam     Physical Exam  Constitutional:       Appearance: Normal appearance  He is normal weight  HENT:      Head: Normocephalic and atraumatic  Nose: Congestion present  Mouth/Throat:      Mouth: Mucous membranes are moist    Eyes:      Extraocular Movements: Extraocular movements intact  Conjunctiva/sclera: Conjunctivae normal       Pupils: Pupils are equal, round, and reactive to light  Cardiovascular:      Rate and Rhythm: Normal rate  Pulmonary:      Effort: Pulmonary effort is normal    Musculoskeletal:         General: Normal range of motion        Cervical back: Normal range of motion and neck supple  Skin:     General: Skin is warm and dry  Neurological:      General: No focal deficit present  Mental Status: He is alert and oriented to person, place, and time     Psychiatric:         Mood and Affect: Mood normal          Behavior: Behavior normal

## 2023-02-28 NOTE — LETTER
February 28, 2023     Patient: Tyrese Bates   YOB: 1990   Date of Visit: 2/28/2023       To Whom It May Concern: It is my medical opinion that Tyrese Bates should not return to work until 03/05/2023  If you have any questions or concerns, please don't hesitate to call           Sincerely,        Ryan Barksdale PA-C    CC: No Recipients

## 2023-03-31 ENCOUNTER — APPOINTMENT (EMERGENCY)
Dept: CT IMAGING | Facility: HOSPITAL | Age: 33
End: 2023-03-31

## 2023-03-31 ENCOUNTER — APPOINTMENT (EMERGENCY)
Dept: RADIOLOGY | Facility: HOSPITAL | Age: 33
End: 2023-03-31

## 2023-03-31 ENCOUNTER — HOSPITAL ENCOUNTER (EMERGENCY)
Facility: HOSPITAL | Age: 33
Discharge: HOME | End: 2023-03-31
Attending: EMERGENCY MEDICINE

## 2023-03-31 VITALS
TEMPERATURE: 97.5 F | DIASTOLIC BLOOD PRESSURE: 66 MMHG | SYSTOLIC BLOOD PRESSURE: 125 MMHG | OXYGEN SATURATION: 95 % | RESPIRATION RATE: 18 BRPM | HEART RATE: 106 BPM

## 2023-03-31 DIAGNOSIS — R19.7 NAUSEA VOMITING AND DIARRHEA: Primary | ICD-10-CM

## 2023-03-31 DIAGNOSIS — R11.2 NAUSEA VOMITING AND DIARRHEA: Primary | ICD-10-CM

## 2023-03-31 DIAGNOSIS — S01.81XA FACIAL LACERATION, INITIAL ENCOUNTER: ICD-10-CM

## 2023-03-31 DIAGNOSIS — Z23 NEED FOR DIPHTHERIA-TETANUS-PERTUSSIS (TDAP) VACCINE: ICD-10-CM

## 2023-03-31 DIAGNOSIS — R00.0 SINUS TACHYCARDIA: ICD-10-CM

## 2023-03-31 DIAGNOSIS — E86.0 DEHYDRATION: ICD-10-CM

## 2023-03-31 DIAGNOSIS — R55 SYNCOPE WITH NORMAL NEUROLOGIC EXAMINATION: ICD-10-CM

## 2023-03-31 DIAGNOSIS — E83.42 HYPOMAGNESEMIA: ICD-10-CM

## 2023-03-31 LAB
2HR DELTA HS TROPONIN: <0 NG/L
ALBUMIN SERPL BCP-MCNC: 4.6 G/DL (ref 3.5–5)
ALP SERPL-CCNC: 80 U/L (ref 34–104)
ALT SERPL W P-5'-P-CCNC: 27 U/L (ref 7–52)
ANION GAP SERPL CALCULATED.3IONS-SCNC: 11 MMOL/L (ref 4–13)
APTT PPP: 23 SECONDS (ref 23–37)
AST SERPL W P-5'-P-CCNC: 19 U/L (ref 13–39)
ATRIAL RATE: 117 BPM
BASOPHILS # BLD AUTO: 0.03 THOUSANDS/ÂΜL (ref 0–0.1)
BASOPHILS NFR BLD AUTO: 0 % (ref 0–1)
BILIRUB SERPL-MCNC: 0.6 MG/DL (ref 0.2–1)
BILIRUB UR QL STRIP: NEGATIVE
BUN SERPL-MCNC: 12 MG/DL (ref 5–25)
CALCIUM SERPL-MCNC: 9.8 MG/DL (ref 8.4–10.2)
CARDIAC TROPONIN I PNL SERPL HS: 2 NG/L
CARDIAC TROPONIN I PNL SERPL HS: <2 NG/L
CHLORIDE SERPL-SCNC: 102 MMOL/L (ref 96–108)
CLARITY UR: CLEAR
CO2 SERPL-SCNC: 27 MMOL/L (ref 21–32)
COLOR UR: YELLOW
CREAT SERPL-MCNC: 0.93 MG/DL (ref 0.6–1.3)
D DIMER PPP FEU-MCNC: 0.37 UG/ML FEU
EOSINOPHIL # BLD AUTO: 0.04 THOUSAND/ÂΜL (ref 0–0.61)
EOSINOPHIL NFR BLD AUTO: 0 % (ref 0–6)
ERYTHROCYTE [DISTWIDTH] IN BLOOD BY AUTOMATED COUNT: 12.9 % (ref 11.6–15.1)
GFR SERPL CREATININE-BSD FRML MDRD: 106 ML/MIN/1.73SQ M
GLUCOSE SERPL-MCNC: 128 MG/DL (ref 65–140)
GLUCOSE SERPL-MCNC: 132 MG/DL (ref 65–140)
GLUCOSE UR STRIP-MCNC: NEGATIVE MG/DL
HCT VFR BLD AUTO: 50.8 % (ref 36.5–49.3)
HGB BLD-MCNC: 16.9 G/DL (ref 12–17)
HGB UR QL STRIP.AUTO: NEGATIVE
IMM GRANULOCYTES # BLD AUTO: 0.03 THOUSAND/UL (ref 0–0.2)
IMM GRANULOCYTES NFR BLD AUTO: 0 % (ref 0–2)
INR PPP: 1 (ref 0.84–1.19)
KETONES UR STRIP-MCNC: NEGATIVE MG/DL
LEUKOCYTE ESTERASE UR QL STRIP: NEGATIVE
LIPASE SERPL-CCNC: 11 U/L (ref 11–82)
LYMPHOCYTES # BLD AUTO: 0.87 THOUSANDS/ÂΜL (ref 0.6–4.47)
LYMPHOCYTES NFR BLD AUTO: 7 % (ref 14–44)
MAGNESIUM SERPL-MCNC: 1.7 MG/DL (ref 1.9–2.7)
MCH RBC QN AUTO: 30.8 PG (ref 26.8–34.3)
MCHC RBC AUTO-ENTMCNC: 33.3 G/DL (ref 31.4–37.4)
MCV RBC AUTO: 93 FL (ref 82–98)
MONOCYTES # BLD AUTO: 0.57 THOUSAND/ÂΜL (ref 0.17–1.22)
MONOCYTES NFR BLD AUTO: 5 % (ref 4–12)
NEUTROPHILS # BLD AUTO: 10.64 THOUSANDS/ÂΜL (ref 1.85–7.62)
NEUTS SEG NFR BLD AUTO: 88 % (ref 43–75)
NITRITE UR QL STRIP: NEGATIVE
NRBC BLD AUTO-RTO: 0 /100 WBCS
P AXIS: 51 DEGREES
PH UR STRIP.AUTO: 8 [PH]
PLATELET # BLD AUTO: 271 THOUSANDS/UL (ref 149–390)
PMV BLD AUTO: 10.6 FL (ref 8.9–12.7)
POTASSIUM SERPL-SCNC: 4 MMOL/L (ref 3.5–5.3)
PR INTERVAL: 136 MS
PROT SERPL-MCNC: 7.9 G/DL (ref 6.4–8.4)
PROT UR STRIP-MCNC: NEGATIVE MG/DL
PROTHROMBIN TIME: 13.4 SECONDS (ref 11.6–14.5)
QRS AXIS: -67 DEGREES
QRSD INTERVAL: 96 MS
QT INTERVAL: 334 MS
QTC INTERVAL: 465 MS
RBC # BLD AUTO: 5.48 MILLION/UL (ref 3.88–5.62)
SODIUM SERPL-SCNC: 140 MMOL/L (ref 135–147)
SP GR UR STRIP.AUTO: 1.01 (ref 1–1.03)
T WAVE AXIS: 54 DEGREES
UROBILINOGEN UR QL STRIP.AUTO: 0.2 E.U./DL
VENTRICULAR RATE: 117 BPM
WBC # BLD AUTO: 12.18 THOUSAND/UL (ref 4.31–10.16)

## 2023-03-31 RX ORDER — MAGNESIUM SULFATE HEPTAHYDRATE 40 MG/ML
2 INJECTION, SOLUTION INTRAVENOUS ONCE
Status: COMPLETED | OUTPATIENT
Start: 2023-03-31 | End: 2023-03-31

## 2023-03-31 RX ORDER — SODIUM CHLORIDE, SODIUM GLUCONATE, SODIUM ACETATE, POTASSIUM CHLORIDE, MAGNESIUM CHLORIDE, SODIUM PHOSPHATE, DIBASIC, AND POTASSIUM PHOSPHATE .53; .5; .37; .037; .03; .012; .00082 G/100ML; G/100ML; G/100ML; G/100ML; G/100ML; G/100ML; G/100ML
1000 INJECTION, SOLUTION INTRAVENOUS ONCE
Status: COMPLETED | OUTPATIENT
Start: 2023-03-31 | End: 2023-03-31

## 2023-03-31 RX ORDER — ONDANSETRON 2 MG/ML
4 INJECTION INTRAMUSCULAR; INTRAVENOUS ONCE
Status: COMPLETED | OUTPATIENT
Start: 2023-03-31 | End: 2023-03-31

## 2023-03-31 RX ORDER — ONDANSETRON 4 MG/1
4 TABLET, ORALLY DISINTEGRATING ORAL EVERY 6 HOURS PRN
Qty: 12 TABLET | Refills: 0 | Status: SHIPPED | OUTPATIENT
Start: 2023-03-31

## 2023-03-31 RX ORDER — ACETAMINOPHEN 325 MG/1
650 TABLET ORAL ONCE
Status: COMPLETED | OUTPATIENT
Start: 2023-03-31 | End: 2023-03-31

## 2023-03-31 RX ADMIN — ACETAMINOPHEN 325MG 650 MG: 325 TABLET ORAL at 10:29

## 2023-03-31 RX ADMIN — SODIUM CHLORIDE, SODIUM GLUCONATE, SODIUM ACETATE, POTASSIUM CHLORIDE AND MAGNESIUM CHLORIDE 1000 ML: 526; 502; 368; 37; 30 INJECTION, SOLUTION INTRAVENOUS at 09:19

## 2023-03-31 RX ADMIN — ONDANSETRON 4 MG: 2 INJECTION INTRAMUSCULAR; INTRAVENOUS at 09:19

## 2023-03-31 RX ADMIN — MAGNESIUM SULFATE HEPTAHYDRATE 2 G: 40 INJECTION, SOLUTION INTRAVENOUS at 10:30

## 2023-03-31 RX ADMIN — IOHEXOL 100 ML: 350 INJECTION, SOLUTION INTRAVENOUS at 09:29

## 2023-03-31 RX ADMIN — SODIUM CHLORIDE, SODIUM GLUCONATE, SODIUM ACETATE, POTASSIUM CHLORIDE AND MAGNESIUM CHLORIDE 1000 ML: 526; 502; 368; 37; 30 INJECTION, SOLUTION INTRAVENOUS at 10:30

## 2023-03-31 RX ADMIN — TETANUS TOXOID, REDUCED DIPHTHERIA TOXOID AND ACELLULAR PERTUSSIS VACCINE, ADSORBED 0.5 ML: 5; 2.5; 8; 8; 2.5 SUSPENSION INTRAMUSCULAR at 10:28

## 2023-03-31 RX ADMIN — ONDANSETRON 4 MG: 2 INJECTION INTRAMUSCULAR; INTRAVENOUS at 12:50

## 2023-03-31 NOTE — DISCHARGE INSTRUCTIONS
Rest, plenty of fluids  Zofran as needed for nausea/vomiting  OTC tylenol and ibuprofen as needed for fever/discomfort  Keep wound clean and dry  This will scab and heal on its own  Watch for signs of infection such as increased redness, warmth, swelling, discharge/drainage, fever or any other concerns  Follow up with PCP for recheck or return to ER as needed

## 2023-03-31 NOTE — ED PROVIDER NOTES
"Emergency Department Trauma Note  Christian Sequeira 27 y o  male MRN: [id number]  Unit/Bed#: AG00/QG17 Encounter: [id number]      Trauma Alert: Trauma Acuity: Trauma Evaluation  Model of Arrival: Mode of Arrival: BLS via Trauma Squad Name and Number: Dylan Pang  Trauma Team: Current Providers  Attending Provider: Cherelle Morelos MD  Physician Assistant: Sukhdev Delgadillo PA-C  Unit Clerk: Huey Mclean  Registered Nurse: Justice Medellin RN  Registered Nurse: Damon Sandhoff, RN  Consultants:     None      History of Present Illness     Chief Complaint:   Chief Complaint   Patient presents with   • Flu Symptoms     Pt brought in by EMS for n/v/d since [id number], passed out on toilet and hit his head  HPI:  Christian Sequeira is a 27 y o  male who presents with nausea, vomiting, diarrhea with subsequent \"passing out\"  Pt was called as a pre-hospital trauma evaluation  Mechanism:Details of Incident: pt was on the toilet when he became dizzy and woke up on the floor Injury Date: 03/31/23 Injury Time: 0846 Injury Occurence Location - [address] [place] [address] Way: [address]    27year old male with PMH asthma presents via EMS from home for evaluation  Pt reports he felt fine yesterday  Ate pizza last night around 9 pm   Overnight woke up with nausea, vomiting and diarrhea  He notes this morning he was dry heaving, began feeling dizzy which he describes as spinning and passed out while he was in the bathroom  He fell forward striking his face from the toilet  He states he lost LOC for a few seconds but then came to  Denies aspirin or blood thinners  C/o a frontal headache  Denies visual disturbance  States dizziness and lightheadedness has resolved at this time  Denies chest pain, SOB, abdominal pain  Denies numbness, tingling, weakness  Denies neck or back pain  Denies any pain in arms or legs  Other than facial injury denies any reported injuries    Greatest complaint at this time is his nausea and upset " stomach  No reported aggravating or alleviating factors  No specific treatments tried  Denies recent travel  Denies sick contacts  Denies recent antibiotic use  History provided by:  Patient and EMS personnel   used: No    Flu Symptoms  Presenting symptoms: diarrhea, nausea and vomiting    Presenting symptoms: no cough, no fatigue, no fever, no headaches, no rhinorrhea, no shortness of breath and no sore throat    Chronicity:  New  Relieved by:  Nothing  Worsened by:  Nothing  Ineffective treatments:  None tried  Associated symptoms: chills    Associated symptoms: no congestion    Risk factors: no diabetes problem, no heart disease and no sick contacts      Review of Systems   Constitutional: Positive for chills and diaphoresis  Negative for fatigue and fever  HENT: Negative  Negative for congestion, rhinorrhea and sore throat  Eyes: Negative  Negative for visual disturbance  Respiratory: Negative  Negative for cough, shortness of breath and wheezing  Cardiovascular: Negative  Negative for chest pain, palpitations and leg swelling  Gastrointestinal: Positive for diarrhea, nausea and vomiting  Negative for abdominal pain and constipation  Genitourinary: Negative  Negative for dysuria, flank pain and hematuria  Musculoskeletal: Negative  Negative for back pain and neck pain  Skin: Negative  Negative for rash  Neurological: Positive for syncope  Negative for dizziness, seizures, light-headedness, numbness and headaches  Psychiatric/Behavioral: Negative  All other systems reviewed and are negative  Historical Information     Immunizations:   Immunization History   Administered Date(s) Administered   • Tdap 03/31/2023       Past Medical History:   Diagnosis Date   • Asthma      History reviewed  No pertinent family history    Past Surgical History:   Procedure Laterality Date   • ANKLE SURGERY Right     pins and plate     Social History     Tobacco Use   • Smoking status: Never     Passive exposure: Never   • Smokeless tobacco: Never   Vaping Use   • Vaping Use: Never used   Substance Use Topics   • Alcohol use: Yes     Comment: socially   • Drug use: No     E-Cigarette/Vaping   • E-Cigarette Use Never User      E-Cigarette/Vaping Substances   • Nicotine No    • THC No    • CBD No    • Flavoring No    • Other No    • Unknown No        Family History: non-contributory    Meds/Allergies   Prior to Admission Medications   Prescriptions Last Dose Informant Patient Reported? Taking?   hydrocortisone (ANUSOL-HC) 2 5 % rectal cream   No No   Sig: Apply topically 2 (two) times a day   Patient not taking: Reported on 2/28/2023   naproxen (NAPROSYN) 500 mg tablet   No No   Sig: Take 1 tablet (500 mg total) by mouth 2 (two) times a day with meals for 10 doses   polyethylene glycol (GLYCOLAX) 17 GM/SCOOP powder   No No   Sig: Take 17 g by mouth daily Use twice daily until condition resolves, then once daily x1 week and then as needed thereafter   Patient not taking: Reported on 2/28/2023      Facility-Administered Medications: None       No Known Allergies    PHYSICAL EXAM    Objective   Vitals:   First set: Temperature: 97 5 °F (36 4 °C) (03/31/23 0915)  Pulse: (!) 111 (03/31/23 0915)  Respirations: 18 (03/31/23 0915)  Blood Pressure: 97/66 (03/31/23 0915)  SpO2: 96 % (03/31/23 0915)    Primary Survey:   (A) Airway: airway patent, pt phonating normally  (B) Breathing: lung sounds present bilaterally  (C) Circulation: Pulses:   normal, carotid  2/4, pedal  2/4, radial  2/4 and femoral  2/4  (D) Disabliity:  GCS Total:  15, Eye Opening:   Spontaneous = 4, Motor Response: Obeys commands = 6 and Verbal Response:  Oriented = 5  (E) Expose:  Completed  Secondary survey was completed for all other injuries  Secondary Survey: (Click on Physical Exam tab above)  Physical Exam  Vitals and nursing note reviewed  Constitutional:       General: He is awake   He is not in acute distress  Appearance: Normal appearance  He is well-developed  He is obese  He is not toxic-appearing or diaphoretic  HENT:      Head: Normocephalic  Laceration present  No raccoon eyes, Arthur's sign, abrasion or contusion  Jaw: There is normal jaw occlusion  Right Ear: Hearing, tympanic membrane, ear canal and external ear normal  No hemotympanum  Left Ear: Hearing, tympanic membrane, ear canal and external ear normal  No hemotympanum  Nose: Nose normal       Mouth/Throat:      Mouth: Mucous membranes are moist       Tongue: Tongue does not deviate from midline  Pharynx: Oropharynx is clear  Uvula midline  Eyes:      General: Lids are normal  No scleral icterus  Extraocular Movements: Extraocular movements intact  Conjunctiva/sclera: Conjunctivae normal       Pupils: Pupils are equal, round, and reactive to light  Neck:      Trachea: Trachea and phonation normal    Cardiovascular:      Rate and Rhythm: Regular rhythm  Tachycardia present  Pulses: Normal pulses  Radial pulses are 2+ on the right side and 2+ on the left side  Dorsalis pedis pulses are 2+ on the right side and 2+ on the left side  Posterior tibial pulses are 2+ on the right side and 2+ on the left side  Heart sounds: Normal heart sounds, S1 normal and S2 normal  No murmur heard  Pulmonary:      Effort: Pulmonary effort is normal  No tachypnea or respiratory distress  Breath sounds: Normal breath sounds  No wheezing, rhonchi or rales  Abdominal:      General: Bowel sounds are normal  There is no distension  Palpations: Abdomen is soft  Tenderness: There is no abdominal tenderness  There is no guarding or rebound  Musculoskeletal:      Cervical back: Normal, normal range of motion and neck supple  No pain with movement or spinous process tenderness  Normal range of motion  Thoracic back: Normal  No bony tenderness  Normal range of motion  Lumbar back: Normal  No bony tenderness  Normal range of motion  Right lower leg: No edema  Left lower leg: No edema  Skin:     General: Skin is warm and dry  Capillary Refill: Capillary refill takes less than 2 seconds  Findings: No abrasion, bruising, erythema or rash  Neurological:      General: No focal deficit present  Mental Status: He is alert and oriented to person, place, and time  GCS: GCS eye subscore is 4  GCS verbal subscore is 5  GCS motor subscore is 6  Cranial Nerves: Cranial nerves 2-12 are intact  Sensory: Sensation is intact  No sensory deficit  Motor: Motor function is intact  No weakness  Gait: Gait normal    Psychiatric:         Mood and Affect: Mood normal          Speech: Speech normal          Behavior: Behavior normal  Behavior is cooperative  Cervical spine cleared by clinical criteria?  Yes     Invasive Devices     None                 Lab Results:   Results Reviewed     Procedure Component Value Units Date/Time    D-dimer, quantitative [[id number]]  (Normal) Collected: 03/31/23 0919    Lab Status: Final result Specimen: Blood Updated: 03/31/23 1237     D-Dimer, Quant 0 37 ug/ml FEU     UA (URINE) with reflex to Scope [[id number]] Collected: 03/31/23 1226    Lab Status: Final result Specimen: Urine, Clean Catch Updated: 03/31/23 1231     Color, UA Yellow     Clarity, UA Clear     Specific Gravity, UA 1 010     pH, UA 8 0     Leukocytes, UA Negative     Nitrite, UA Negative     Protein, UA Negative mg/dl      Glucose, UA Negative mg/dl      Ketones, UA Negative mg/dl      Urobilinogen, UA 0 2 E U /dl      Bilirubin, UA Negative     Occult Blood, UA Negative    HS Troponin I 2hr [[id number]]  (Normal) Collected: 03/31/23 1143    Lab Status: Final result Specimen: Blood from Arm, Left Updated: 03/31/23 1211     hs TnI 2hr <2 ng/L      Delta 2hr hsTnI <0 ng/L     Protime-INR [[id number]]  (Normal) Collected: 03/31/23 0919    Lab Status: Final result Specimen: Blood from Arm, Right Updated: 03/31/23 0955     Protime 13 4 seconds      INR 1 00    APTT [[id number]]  (Normal) Collected: 03/31/23 0919    Lab Status: Final result Specimen: Blood from Arm, Right Updated: 03/31/23 0955     PTT 23 seconds     HS Troponin 0hr (reflex protocol) [[id number]]  (Normal) Collected: 03/31/23 0919    Lab Status: Final result Specimen: Blood from Arm, Right Updated: 03/31/23 0955     hs TnI 0hr 2 ng/L     Fingerstick Glucose (POCT) [[id number]]  (Normal) Collected: 03/31/23 0915    Lab Status: Final result Updated: 03/31/23 0950     POC Glucose 128 mg/dl     Comprehensive metabolic panel [[id number]] Collected: 03/31/23 0919    Lab Status: Final result Specimen: Blood from Arm, Right Updated: 03/31/23 0949     Sodium 140 mmol/L      Potassium 4 0 mmol/L      Chloride 102 mmol/L      CO2 27 mmol/L      ANION GAP 11 mmol/L      BUN 12 mg/dL      Creatinine 0 93 mg/dL      Glucose 132 mg/dL      Calcium 9 8 mg/dL      AST 19 U/L      ALT 27 U/L      Alkaline Phosphatase 80 U/L      Total Protein 7 9 g/dL      Albumin 4 6 g/dL      Total Bilirubin 0 60 mg/dL      eGFR 106 ml/min/1 73sq m     Narrative:      Tricia guidelines for Chronic Kidney Disease (CKD):   •  Stage 1 with normal or high GFR (GFR > 90 mL/min/1 73 square meters)  •  Stage 2 Mild CKD (GFR = 60-89 mL/min/1 73 square meters)  •  Stage 3A Moderate CKD (GFR = 45-59 mL/min/1 73 square meters)  •  Stage 3B Moderate CKD (GFR = 30-44 mL/min/1 73 square meters)  •  Stage 4 Severe CKD (GFR = 15-29 mL/min/1 73 square meters)  •  Stage 5 End Stage CKD (GFR <15 mL/min/1 73 square meters)  Note: GFR calculation is accurate only with a steady state creatinine    Lipase [[id number]]  (Normal) Collected: 03/31/23 0919    Lab Status: Final result Specimen: Blood from Arm, Right Updated: 03/31/23 0949     Lipase 11 u/L     Magnesium [[id number]]  (Abnormal) Collected: 03/31/23 0919    Lab Status: Final result Specimen: Blood from Arm, Right Updated: 03/31/23 0949     Magnesium 1 7 mg/dL     CBC and differential [[id number]]  (Abnormal) Collected: 03/31/23 0919    Lab Status: Final result Specimen: Blood from Arm, Right Updated: 03/31/23 0932     WBC 12 18 Thousand/uL      RBC 5 48 Million/uL      Hemoglobin 16 9 g/dL      Hematocrit 50 8 %      MCV 93 fL      MCH 30 8 pg      MCHC 33 3 g/dL      RDW 12 9 %      MPV 10 6 fL      Platelets 644 Thousands/uL      nRBC 0 /100 WBCs      Neutrophils Relative 88 %      Immat GRANS % 0 %      Lymphocytes Relative 7 %      Monocytes Relative 5 %      Eosinophils Relative 0 %      Basophils Relative 0 %      Neutrophils Absolute 10 64 Thousands/µL      Immature Grans Absolute 0 03 Thousand/uL      Lymphocytes Absolute 0 87 Thousands/µL      Monocytes Absolute 0 57 Thousand/µL      Eosinophils Absolute 0 04 Thousand/µL      Basophils Absolute 0 03 Thousands/µL                  Imaging Studies:   Direct to CT: Yes  TRAUMA - CT chest abdomen pelvis w contrast   Final Result by Raj Berger MD (03/31 1002)      No CT findings of trauma in the chest, abdomen or pelvis            Workstation performed: [id number]         TRAUMA - CT head wo contrast   Final Result by Raj Berger MD (03/31 1002)   Left maxillary and right sphenoid sinusitis  No acute intracranial abnormality  Workstation performed: [id number]         XR Trauma chest portable   Final Result by Cora Wolfe DO (03/31 1012)      No acute cardiopulmonary disease                    Workstation performed: [id number]               Procedures  ECG 12 Lead Documentation Only    Date/Time: 3/31/2023 9:52 AM  Performed by: Lilliana Estrella PA-C  Authorized by: Lilliana Estrella PA-C     Indications / Diagnosis:  Syncope  ECG reviewed by me, the ED Provider: yes    Patient location:  ED  Previous ECG:     Previous ECG:  Unavailable    Comparison to cardiac monitor: Yes Interpretation:     Interpretation: abnormal    Rate:     ECG rate:  117    ECG rate assessment: tachycardic    Rhythm:     Rhythm: sinus tachycardia    Ectopy:     Ectopy: none    Conduction:     Conduction: abnormal      Abnormal conduction: incomplete RBBB and LAFB    ST segments:     ST segments:  Normal  T waves:     T waves: normal    Comments:      , QRS 96, QT/QTc 334/465; no acute ischemic changes  ED Course  ED Course as of 03/31/23 1526   Fri Mar 31, 2023   0925 XR Trauma chest portable  Independently viewed and interpreted by me - no acute cardiopulmonary process; pending official read  0932 WBC(!): 12 18  Likely reactive due to vomiting   0932 Hemoglobin: 16 9   0932 Platelet Count: 677   0952 POC Glucose: 128   0952 Glucose, Random: 132   0952 Creatinine: 0 93   0952 BUN: 12   0952 Sodium: 140   0952 Potassium: 4 0   0952 Chloride: 102   0952 CO2: 27   0952 Anion Gap: 11   0952 Calcium: 9 8   0952 AST: 19   0952 ALT: 27   0952 Alkaline Phosphatase: 80   0952 Total Protein: 7 9   0952 Albumin: 4 6   0952 TOTAL BILIRUBIN: 0 60   0952 eGFR: 106   0952 Lipase: 11   0952 Magnesium(!): 1 7  Will replete   0957 hs TnI 0hr: 2   0957 POCT INR: 1 00   0957 PROTIME: 13 4   0957 PTT: 23   1008 TRAUMA - CT head wo contrast  IMPRESSION:  Left maxillary and right sphenoid sinusitis  No acute intracranial abnormality  1008 TRAUMA - CT chest abdomen pelvis w contrast  IMPRESSION:     No CT findings of trauma in the chest, abdomen or pelvis   1008 Pt cleared from trauma perspective  Work up ongoing for syncope and GI symptoms  Will require delta troponin  1010 Pt reassessed  Reports feeling improved  Requesting something to drink  He questions if he can go home  Discussed with him that I do want him to have some additional fluids, replete his mag and plan for a delta troponin  If this is negative and he continues to feel improved, I anticipate discharge home with symptomatic management  Pt unsure of last tetanus shot, I do not see one in chart and he is agreeable to update  1144 Repeat troponin collected  [address]  [address] hsTnI: <0  Not c/w ACS   1213 Pt updated on additional results  He notes nausea is returning, resting comfortably, no distress  HR remains elevated despite fluids  BP stable  Discussed obtaining d dimer to help guide evaluation for PE  Pt otherwise does not have risk factors for PE, not tachypneic or hypoxic  Pt agreeable and aware that if d dimer elevated, additional CTA would be needed for further evaluate  1222 Pt provided urinal   Pt stood at bedside, no dizziness or lightheadedness  1226 Call placed to lab regarding add on d dimer  1232 UA (URINE) with reflex to Scope  Unremarkable, not suggestive of infection   1241 D-Dimer, Quant: 0 37  D dimer is negative, PE felt less likely   1300 Pt reassessed  Reports feeling improved  His nausea is better  There has been no vomiting, no further dizziness or lightheadedness  He still remains slightly tachycardic but has been down trending after fluid resuscitation  We discussed admission for further observation which pt declined and he would like to go home  Requesting note for work  Medical Decision Making  28 yo male presents via EMS from home after a suspect syncopal episode  Recent illness of nausea, vomiting, diarrhea  Suspect syncopal episode related to dehydration vs vasovagal secondary to vomiting  Borderline hypotension and tachycardia upon arrival suspected in setting of volume depletion  Will check fingerstick glucose to ensure no hypoglycemia  Pt was pre-hospital trauma evaluation due to syncope/head strike/LOC  GCS=15 upon arrival   Will obtain EKG, CXR, labs as well as CT imaging (CT head to evaluate for intracranial process, CT chest/abd/pelv due to nausea/vomiting/diarrhea)  I do not suspect a traumatic process in the chest, abdomen or pelvis    C spine cleared on exam   Will provide anti-emetic and fluids while work up ongoing  Please see above and ER course for additional details  Dehydration: acute illness or injury  Facial laceration, initial encounter: acute illness or injury     Details: No repair needed  reviewed local wound care  s/sx infection reviewed  tetanus updated  Hypomagnesemia: acute illness or injury     Details: repleted with IV mag  Nausea vomiting and diarrhea: acute illness or injury     Details: likely in setting of viral gastroenteritis  Need for diphtheria-tetanus-pertussis (Tdap) vaccine: self-limited or minor problem  Sinus tachycardia: acute illness or injury     Details: Suspected to be in setting of volume depletion  no infectious etiology noted  Shalini Alexander low risk, d dimer negative, doubt PE  Syncope with normal neurologic examination: acute illness or injury     Details: Heart score =2, low risk, EKG and serial troponins not c/w ACS  no noted arrhythmias on telemetry  Suspected to be vasovagal in nature vs hypotension secondary to dehydration  Amount and/or Complexity of Data Reviewed  Independent Historian: EMS  External Data Reviewed: labs and notes  Labs: ordered  Decision-making details documented in ED Course  Radiology: ordered and independent interpretation performed  Decision-making details documented in ED Course  ECG/medicine tests: ordered and independent interpretation performed  Decision-making details documented in ED Course  Risk  OTC drugs  Prescription drug management  Decision regarding hospitalization                    Disposition  [facility] Transfer: No  Final diagnoses:   Nausea vomiting and diarrhea   Dehydration   Syncope with normal neurologic examination   Facial laceration, initial encounter   Sinus tachycardia   Hypomagnesemia   Need for diphtheria-tetanus-pertussis (Tdap) vaccine     Time reflects when diagnosis was documented in both MDM as applicable and the Disposition within this note     Time User Action Codes Description Comment    3/31/2023  1:08 PM [place] Add [R11 2,  R19 7] Nausea vomiting and diarrhea     3/31/2023  1:08 PM Isela [place] Add [E86 0] Dehydration     3/31/2023  1:08 PM Isela [place] Add [R55] Syncope with normal neurologic examination     3/31/2023  1:08 PM Isela [place] Add [S01 81XA] Facial laceration, initial encounter     3/31/2023  1:08 PM Isela [place] Add [R00 0] Sinus tachycardia     3/31/2023  1:09 PM Dana Dyers [E83 42] Hypomagnesemia     3/31/2023  1:09 PM [place] Add [Z23] Need for diphtheria-tetanus-pertussis (Tdap) vaccine       ED Disposition     ED Disposition   Discharge    Condition   Stable    Date/Time   Fri Mar 31, 2023  1:08 PM    Comment   Ty Self discharge to home/self care                 Follow-up Information     Follow up With Specialties Details Why Contact Info Additional [address] [facility] Emergency Department Emergency Medicine  As needed [place]  [id number]  [facility] Emergency Department, [address], [address]        Discharge Medication List as of 3/31/2023  1:11 PM      START taking these medications    Details   ondansetron (ZOFRAN-ODT) 4 mg disintegrating tablet Take 1 tablet (4 mg total) by mouth every 6 (six) hours as needed for vomiting or nausea, Starting Fri 3/31/2023, Normal         CONTINUE these medications which have NOT CHANGED    Details   hydrocortisone (ANUSOL-HC) 2 5 % rectal cream Apply topically 2 (two) times a day, Starting Fri 6/17/2022, Normal      naproxen (NAPROSYN) 500 mg tablet Take 1 tablet (500 mg total) by mouth 2 (two) times a day with meals for 10 doses, Starting Mon 10/19/2020, Until Sat 10/24/2020, Normal      polyethylene glycol (GLYCOLAX) 17 GM/SCOOP powder Take 17 g by mouth daily Use twice daily until condition resolves, then once daily x1 week and then as needed thereafter, Starting Fri 6/17/2022, Normal           No discharge procedures on file      PDMP Review     None          ED Provider  Electronically Signed by         Arnaldo Krishnan PA-C  03/31/23 6024

## 2023-03-31 NOTE — Clinical Note
Godwin Sebastian was seen and treated in our emergency department on 3/31/2023  Diagnosis:     Christopher CHUA    He may return on this date: 04/03/2023         If you have any questions or concerns, please don't hesitate to call        [place] PA-C    ______________________________           _______________          _______________  Hospital Representative                              Date                                Time

## 2023-10-05 ENCOUNTER — OFFICE VISIT (OUTPATIENT)
Dept: FAMILY MEDICINE CLINIC | Facility: CLINIC | Age: 33
End: 2023-10-05
Payer: COMMERCIAL

## 2023-10-05 VITALS
OXYGEN SATURATION: 98 % | WEIGHT: 315 LBS | SYSTOLIC BLOOD PRESSURE: 132 MMHG | HEIGHT: 70 IN | BODY MASS INDEX: 45.1 KG/M2 | DIASTOLIC BLOOD PRESSURE: 98 MMHG | TEMPERATURE: 97.5 F | HEART RATE: 90 BPM

## 2023-10-05 DIAGNOSIS — R03.0 ELEVATED BLOOD PRESSURE READING: Primary | ICD-10-CM

## 2023-10-05 DIAGNOSIS — Z13.89 SCREENING FOR MULTIPLE CONDITIONS: ICD-10-CM

## 2023-10-05 DIAGNOSIS — Z31.9 ENCOUNTER FOR INFERTILITY: ICD-10-CM

## 2023-10-05 DIAGNOSIS — Z00.00 WELL ADULT EXAM: ICD-10-CM

## 2023-10-05 PROBLEM — J45.20 MILD INTERMITTENT ASTHMA WITHOUT COMPLICATION: Status: ACTIVE | Noted: 2023-10-05

## 2023-10-05 PROBLEM — S46.011A STRAIN OF RIGHT ROTATOR CUFF CAPSULE: Status: RESOLVED | Noted: 2020-10-19 | Resolved: 2023-10-05

## 2023-10-05 PROBLEM — S63.501A SPRAIN AND STRAIN OF RIGHT WRIST: Status: RESOLVED | Noted: 2020-10-19 | Resolved: 2023-10-05

## 2023-10-05 PROBLEM — S46.911A ELBOW STRAIN, RIGHT, INITIAL ENCOUNTER: Status: RESOLVED | Noted: 2020-10-19 | Resolved: 2023-10-05

## 2023-10-05 PROBLEM — S56.911A ELBOW STRAIN, RIGHT, INITIAL ENCOUNTER: Status: RESOLVED | Noted: 2020-10-19 | Resolved: 2023-10-05

## 2023-10-05 PROBLEM — S66.911A SPRAIN AND STRAIN OF RIGHT WRIST: Status: RESOLVED | Noted: 2020-10-19 | Resolved: 2023-10-05

## 2023-10-05 PROCEDURE — 99385 PREV VISIT NEW AGE 18-39: CPT | Performed by: PHYSICIAN ASSISTANT

## 2023-10-05 PROCEDURE — 99203 OFFICE O/P NEW LOW 30 MIN: CPT | Performed by: PHYSICIAN ASSISTANT

## 2023-10-05 NOTE — LETTER
October 5, 2023     Patient: Dav Armijo  YOB: 1990  Date of Visit: 10/5/2023      To Whom it May Concern:    Dav Armijo is under my professional care. Sonny Torres was seen in my office on 10/5/2023. Christopher CHUA may return to work on 10/6/23 . If you have any questions or concerns, please don't hesitate to call.          Sincerely,          Michael Myers PA-C        CC: No Recipients

## 2023-10-05 NOTE — PROGRESS NOTES
ADULT ANNUAL [address] [place] [address] PRIMARY CARE    NAME: Khang Blanco  AGE: 26 y.o. SEX: male  : 1990     DATE: 10/5/2023     Assessment and Plan:     Problem List Items Addressed This Visit        Other    Screening for multiple conditions    Relevant Orders    Lipid Panel with Direct LDL reflex    Comprehensive metabolic panel    Elevated blood pressure reading - Primary     Pt to monitor at home, RTO if consistently >140/90. Pt advised to decrease caffeine/iced tea, reports drinking 2 gallons of sweetened iced tea daily. Encounter for infertility     Referral placed to infertility office RMA. Relevant Orders    Ambulatory Referral to Infertility   Other Visit Diagnoses     Well adult exam              Immunizations and preventive care screenings were discussed with patient today. Appropriate education was printed on patient's after visit summary. Counseling:  Sexual health: discussed sexually transmitted diseases, partner selection, use of condoms, avoidance of unintended pregnancy, and contraceptive alternatives. Exercise: the importance of regular exercise/physical activity was discussed. Recommend exercise 3-5 times per week for at least 30 minutes. BMI Counseling: Body mass index is 53.38 kg/m². The BMI is above normal. Nutrition recommendations include decreasing portion sizes, encouraging healthy choices of fruits and vegetables, decreasing fast food intake, consuming healthier snacks, limiting drinks that contain sugar, moderation in carbohydrate intake, increasing intake of lean protein, reducing intake of saturated and trans fat and reducing intake of cholesterol. Exercise recommendations include moderate physical activity 150 minutes/week. Rationale for BMI follow-up plan is due to patient being overweight or obese. Depression Screening and Follow-up Plan: Patient was screened for depression during today's encounter.  They screened negative with a PHQ-2 score of 0. Return if symptoms worsen or fail to improve. Chief Complaint:     Chief Complaint   Patient presents with   • Establish Care     Pt presents as a new pt to establish care   Pt does not remember the last time he saw a PCP       History of Present Illness:     Adult Annual Physical   Patient here for a comprehensive physical exam. The patient reports problems - he and significant other are trying to conceive. Diet and Physical Activity  Diet/Nutrition: poor diet. Exercise: no formal exercise. Depression Screening  PHQ-2/9 Depression Screening    Little interest or pleasure in doing things: 0 - not at all  Feeling down, depressed, or hopeless: 0 - not at all  PHQ-2 Score: 0  PHQ-2 Interpretation: Negative depression screen       General Health  Sleep: sleeps well. Hearing: normal - bilateral.  Vision: no vision problems. Dental: no dental visits for >1 year. Review of Systems:     Review of Systems   Constitutional: Negative for activity change, appetite change, chills, diaphoresis, fatigue, fever and unexpected weight change. HENT: Negative for congestion, ear pain, postnasal drip, rhinorrhea, sinus pressure, sinus pain, sneezing, sore throat, tinnitus and voice change. Eyes: Negative for pain, redness and visual disturbance. Respiratory: Negative for cough, chest tightness, shortness of breath and wheezing. Cardiovascular: Negative for palpitations and leg swelling. Gastrointestinal: Negative for abdominal pain, blood in stool, constipation, diarrhea, nausea and vomiting. Genitourinary: Negative for difficulty urinating, dysuria, frequency, hematuria and urgency. Musculoskeletal: Negative for arthralgias, back pain, gait problem, joint swelling, myalgias, neck pain and neck stiffness. Skin: Negative for color change, pallor, rash and wound. Neurological: Negative for dizziness, tremors, weakness, light-headedness and headaches. Psychiatric/Behavioral: Negative for dysphoric mood, self-injury, sleep disturbance and suicidal ideas. The patient is not nervous/anxious. Past Medical History:     Past Medical History:   Diagnosis Date   • Asthma       Past Surgical History:     Past Surgical History:   Procedure Laterality Date   • ANKLE SURGERY Right     pins and plate      Social History:     Social History     Socioeconomic History   • Marital status: Single     Spouse name: None   • Number of children: None   • Years of education: None   • Highest education level: None   Occupational History   • None   Tobacco Use   • Smoking status: Never     Passive exposure: Never   • Smokeless tobacco: Never   Vaping Use   • Vaping Use: Never used   Substance and Sexual Activity   • Alcohol use: Yes     Comment: socially   • Drug use: No   • Sexual activity: Not Currently   Other Topics Concern   • None   Social History Narrative   • None     Social Determinants of Health     Financial Resource Strain: Not on file   Food Insecurity: Not on file   Transportation Needs: Not on file   Physical Activity: Not on file   Stress: Not on file   Social Connections: Not on file   Intimate Partner Violence: Not on file   Housing Stability: Not on file      Family History:     Family History   Problem Relation Age of Onset   • No Known Problems Mother    • No Known Problems Father    • No Known Problems Brother       Current Medications:     No current outpatient medications on file. No current facility-administered medications for this visit. Allergies:     No Known Allergies   Physical Exam:     /98   Pulse 90   Temp 97.5 °F (36.4 °C)   Ht 5' 10" (1.778 m)   Wt (!) 169 kg (372 lb)   SpO2 98%   BMI 53.38 kg/m²     Physical Exam  Vitals and nursing note reviewed. Constitutional:       General: He is not in acute distress. Appearance: He is well-developed. He is obese. HENT:      Head: Normocephalic and atraumatic.    Eyes: Conjunctiva/sclera: Conjunctivae normal.   Cardiovascular:      Rate and Rhythm: Normal rate and regular rhythm. Heart sounds: No murmur heard. Pulmonary:      Effort: Pulmonary effort is normal. No respiratory distress. Breath sounds: Normal breath sounds. Abdominal:      Palpations: Abdomen is soft. Tenderness: There is no abdominal tenderness. Musculoskeletal:         General: No swelling. Cervical back: Neck supple. Skin:     General: Skin is warm and dry. Capillary Refill: Capillary refill takes less than 2 seconds. Neurological:      Mental Status: He is alert.    Psychiatric:         Mood and Affect: Mood normal.          LIZBETH Moulton

## 2023-10-05 NOTE — ASSESSMENT & PLAN NOTE
Pt to monitor at home, RTO if consistently >140/90. Pt advised to decrease caffeine/iced tea, reports drinking 2 gallons of sweetened iced tea daily.

## 2023-11-09 ENCOUNTER — CONSULT (OUTPATIENT)
Dept: UROLOGY | Facility: CLINIC | Age: 33
End: 2023-11-09
Payer: COMMERCIAL

## 2023-11-09 VITALS
SYSTOLIC BLOOD PRESSURE: 132 MMHG | BODY MASS INDEX: 53.95 KG/M2 | DIASTOLIC BLOOD PRESSURE: 98 MMHG | WEIGHT: 315 LBS | HEART RATE: 95 BPM

## 2023-11-09 DIAGNOSIS — Z31.9 ENCOUNTER FOR INFERTILITY: Primary | ICD-10-CM

## 2023-11-09 PROCEDURE — 99203 OFFICE O/P NEW LOW 30 MIN: CPT

## 2023-11-09 NOTE — PROGRESS NOTES
11/9/2023    No chief complaint on file. Assessment and Plan    26 y.o. male new patient to office    Infertility  Patient was referred due to issues with conceiving and requesting semen analysis. I explained to both the patient and his wife that our practice does not deal with infertility and he needs to see reproductive medicine for further evaluation of this. I did offer to place orders for semen analysis however he does understand that I do not interpret these results from a reproductive medicine standpoint. He was provided with a referral to [facility]ty with Dr. Zay Valdez. History of Present Illness  Christopher Hill Head is a 26 y.o. male new patient to office. Here for evaluation of semen analysis    Patient presents today with his wife for evaluation of issues with difficulty conceiving. He and his wife have been trying for approximately 8 months but of been unsuccessful. She does have a history of PCOS and has been receiving treatment for this. She does have 2 children of her own. Review of Systems   Constitutional:  Negative for chills and fever. HENT:  Negative for congestion and sore throat. Respiratory:  Negative for cough and shortness of breath. Cardiovascular:  Negative for chest pain and leg swelling. Gastrointestinal:  Negative for abdominal pain, constipation and diarrhea. Genitourinary:  Negative for difficulty urinating, dysuria, frequency, hematuria and urgency. Musculoskeletal:  Negative for back pain and gait problem. Skin:  Negative for wound. Allergic/Immunologic: Negative for immunocompromised state. Hematological:  Does not bruise/bleed easily.            AUA SYMPTOM SCORE      Flowsheet Row Most Recent Value   AUA SYMPTOM SCORE    How often have you had a sensation of not emptying your bladder completely after you finished urinating? 0 (P)     How often have you had to urinate again less than two hours after you finished urinating? 1 (P)     How often have you found you stopped and started again several times when you urinate? 0 (P)     How often have you found it difficult to postpone urination? 0 (P)     How often have you had a weak urinary stream? 0 (P)     How often have you had to push or strain to begin urination? 0 (P)     How many times did you most typically get up to urinate from the time you went to bed at night until the time you got up in the morning? 1 (P)     Quality of Life: If you were to spend the rest of your life with your urinary condition just the way it is now, how would you feel about that? 1 (P)     AUA SYMPTOM SCORE 2 (P)             Vitals  Vitals:    11/09/23 0819   BP: 132/98   Pulse: 95   Weight: (!) 171 kg (376 lb)       Physical Exam  Vitals reviewed. Constitutional:       General: He is not in acute distress. Appearance: Normal appearance. He is not ill-appearing or toxic-appearing. HENT:      Head: Normocephalic and atraumatic. Eyes:      General: No scleral icterus. Conjunctiva/sclera: Conjunctivae normal.   Cardiovascular:      Rate and Rhythm: Normal rate. Pulmonary:      Effort: Pulmonary effort is normal. No respiratory distress. Abdominal:      Tenderness: There is no right CVA tenderness or left CVA tenderness. Hernia: No hernia is present. Musculoskeletal:      Cervical back: Normal range of motion. Right lower leg: No edema. Left lower leg: No edema. Skin:     General: Skin is warm and dry. Coloration: Skin is not jaundiced or pale. Neurological:      General: No focal deficit present. Mental Status: He is alert and oriented to person, place, and time. Mental status is at baseline. Gait: Gait normal.   Psychiatric:         Mood and Affect: Mood normal.         Behavior: Behavior normal.         Thought Content:  Thought content normal.         Judgment: Judgment normal.         Past History  Past Medical History:   Diagnosis Date    Asthma      Social History     Socioeconomic History    Marital status: Single     Spouse name: None    Number of children: None    Years of education: None    Highest education level: None   Occupational History    None   Tobacco Use    Smoking status: Never     Passive exposure: Never    Smokeless tobacco: Never   Vaping Use    Vaping Use: Never used   Substance and Sexual Activity    Alcohol use: Not Currently     Comment: socially/ trying to have kids    Drug use: No    Sexual activity: Not Currently   Other Topics Concern    None   Social History Narrative    None     Social Determinants of Health     Financial Resource Strain: Not on file   Food Insecurity: Not on file   Transportation Needs: Not on file   Physical Activity: Not on file   Stress: Not on file   Social Connections: Not on file   Intimate Partner Violence: Not on file   Housing Stability: Not on file     Social History     Tobacco Use   Smoking Status Never    Passive exposure: Never   Smokeless Tobacco Never     Family History   Problem Relation Age of Onset    No Known Problems Mother     No Known Problems Father     No Known Problems Brother        The following portions of the patient's history were reviewed and updated as appropriate allergies, current medications, past medical history, past social history, past surgical history and problem list    Imaging:    Results  No results found for this or any previous visit (from the past 1 hour(s)). ]  No results found for: "PSA"  Lab Results   Component Value Date    CALCIUM 9.8 03/31/2023    K 4.0 03/31/2023    CO2 27 03/31/2023     03/31/2023    BUN 12 03/31/2023    CREATININE 0.93 03/31/2023     Lab Results   Component Value Date    WBC 12.18 (H) 03/31/2023    HGB 16.9 03/31/2023    HCT 50.8 (H) 03/31/2023    MCV 93 03/31/2023     03/31/2023       Please Note:  Voice dictation software has been used to create this document. There may be inadvertent transcriptions errors. OKSANA Roman 11/09/23

## 2024-02-21 ENCOUNTER — OFFICE VISIT (OUTPATIENT)
Dept: URGENT CARE | Facility: MEDICAL CENTER | Age: 34
End: 2024-02-21
Payer: COMMERCIAL

## 2024-02-21 VITALS
BODY MASS INDEX: 44.1 KG/M2 | TEMPERATURE: 98.8 F | HEIGHT: 71 IN | SYSTOLIC BLOOD PRESSURE: 132 MMHG | RESPIRATION RATE: 20 BRPM | HEART RATE: 103 BPM | DIASTOLIC BLOOD PRESSURE: 88 MMHG | OXYGEN SATURATION: 99 % | WEIGHT: 315 LBS

## 2024-02-21 DIAGNOSIS — R09.81 SINUS CONGESTION: Primary | ICD-10-CM

## 2024-02-21 DIAGNOSIS — R05.1 ACUTE COUGH: ICD-10-CM

## 2024-02-21 LAB
SARS-COV-2 AG UPPER RESP QL IA: NEGATIVE
VALID CONTROL: NORMAL

## 2024-02-21 PROCEDURE — 87636 SARSCOV2 & INF A&B AMP PRB: CPT

## 2024-02-21 PROCEDURE — 87811 SARS-COV-2 COVID19 W/OPTIC: CPT

## 2024-02-21 PROCEDURE — 99212 OFFICE O/P EST SF 10 MIN: CPT

## 2024-02-21 RX ORDER — FLUTICASONE PROPIONATE 50 MCG
1 SPRAY, SUSPENSION (ML) NASAL DAILY
Qty: 11.1 ML | Refills: 0 | Status: SHIPPED | OUTPATIENT
Start: 2024-02-21

## 2024-02-21 RX ORDER — GUAIFENESIN 600 MG/1
1200 TABLET, EXTENDED RELEASE ORAL EVERY 12 HOURS SCHEDULED
Qty: 45 TABLET | Refills: 0 | Status: SHIPPED | OUTPATIENT
Start: 2024-02-21

## 2024-02-21 NOTE — PATIENT INSTRUCTIONS
You may take over the counter Tylenol (Acetaminophen) and/or Motrin (Ibuprofen) as needed, as directed on packaging.   Be sure to get plenty of rest, and drinking fluids to remain hydrated.     Please follow up with your primary provider in the next several days. Should you have any worsening of symptoms, or lack of improvement please be re-evaluated. If needed for significant concerns, consider 911 or ER evaluation.     Over the counter decongestants can be used, only as directed on the box. However, if you have any history of cardiac disease or high blood pressure these should be avoided, as we caution the use of these since they can make place strain on your heart and cause increase in blood pressure. It is recommended in lieu of decongestants to use cool mist vaporizer, saline nasal spray to relieve nasal congestion. It is also important to remain hydrated and drink plenty of fluids (avoiding caffeine and alcohol).   Plain Mucinex (1200mg twice daily) is an expectorant medication which will help to relieve the chest congestion, it is important to drink lots of fluids and keep hydrated.  Flonase Nasal spray should be used as well.     If you were just recently exposed to someone who was ill with COVID, it may be too early to detect the virus. You should monitor your symptoms, and if you continue to have symptoms or they worsen you can obtain a Home COVID test at your local pharmacy to test again in a few days. Until you are feeling well, you should be sure to use proper hygiene to etiquette to prevent the spread of any illness you may be experiencing regardless of your test results. COVID and Flu are both viral illnesses with similar presentation and both require only symptomatic treatments for most patients. Should you have any questions about your treatment please follow up with your family doctor's office.     COVID-19 Key Points  People who are infected but asymptomatic or people with mild COVID-19 should  isolate through at least day 5 (day 0 is the day symptoms appeared or the date the specimen was collected for the positive test for people who are asymptomatic). They should wear a mask through day 10. A test-based strategy may be used to remove a mask sooner.  People with moderate or severe COVID-19 should isolate through at least day 10. Those with severe COVID-19 may remain infectious beyond 10 days and may need to extend isolation for up to 20 days.  People who are moderately or severely immunocompromised should isolate through at least day 20. Use of serial testing and consultation with an infectious disease specialist is recommended in these patients prior to ending isolation.  Isolation can be discontinued at least 5 days after symptom onset (day 0 is the day symptoms appeared, and day 1 is the next full day thereafter) if fever has resolved for at least 24 hours (without taking fever-reducing medications) and other symptoms are improving.  Loss of taste and smell may persist for weeks or months after recovery and need not delay the end of isolation.  A high-quality mask should be worn around others at home and in public through day 10. If symptoms recur or worsen, the isolation period should restart at day 0.

## 2024-02-21 NOTE — PROGRESS NOTES
[facility]        NAME: Christopher Moody is a 33 y.o. male  : 1990    MRN: [id number]  DATE: 2024  TIME: 3:16 PM    Assessment and Plan   Sinus congestion [R09.81]  1. Sinus congestion  Covid/Flu- Office Collect Normal    fluticasone (FLONASE) 50 mcg/act nasal spray    guaiFENesin (MUCINEX) 600 mg 12 hr tablet      2. Acute cough  Poct Covid 19 Rapid Antigen Test    guaiFENesin (MUCINEX) 600 mg 12 hr tablet            Patient Instructions       Follow up with PCP in 3-5 days.  Proceed to  ER if symptoms worsen.    Chief Complaint     Chief Complaint   Patient presents with   • Cold Like Symptoms     Pt woke up yesterday shaky. Took Nyquil and slept all day. Woke up today with head pressure and sinus congestion. Nothing taken today for symptoms.          History of Present Illness       Patient started with chills. Took Nyquil and slept most of the day. He woke up today with sinus congestion and head pressure. He has not taken anything for his pain. He reports pain in the base of his head/neck, he reports this as an irritation only when drinking fluids, no problems chewing/swallowing. He has ate a soft sandwich without difficulties. He does have change in appetite and only eating small amounts each meal vs. his normal size portion. No fever. Had some lightheadedness when he stood up yesterday. He has not taken an at home COVID test. He reports he has had a dry mouth - he has been mouth breathing.         Review of Systems   Review of Systems   Constitutional:  Positive for appetite change and chills. Negative for fatigue and fever.   HENT:  Positive for congestion, postnasal drip and rhinorrhea. Negative for ear pain, sinus pressure, sinus pain, sore throat and trouble swallowing.    Eyes:  Negative for visual disturbance.   Respiratory:  Negative for cough and shortness of breath.    Cardiovascular:  Negative for chest pain and palpitations.   Gastrointestinal:  Negative for abdominal  "pain, constipation, diarrhea, nausea and vomiting.   Musculoskeletal:  Positive for neck pain. Negative for arthralgias and back pain.   Skin:  Negative for color change and rash.   Neurological:  Positive for dizziness, light-headedness and headaches. Negative for seizures and syncope.   All other systems reviewed and are negative.        Current Medications       Current Outpatient Medications:   •  fluticasone (FLONASE) 50 mcg/act nasal spray, 1 spray into each nostril daily, Disp: 11.1 mL, Rfl: 0  •  guaiFENesin (MUCINEX) 600 mg 12 hr tablet, Take 2 tablets (1,200 mg total) by mouth every 12 (twelve) hours, Disp: 45 tablet, Rfl: 0    Current Allergies     Allergies as of 02/21/2024   • (No Known Allergies)            The following portions of the patient's history were reviewed and updated as appropriate: allergies, current medications, past family history, past medical history, past social history, past surgical history and problem list.     Past Medical History:   Diagnosis Date   • Asthma        Past Surgical History:   Procedure Laterality Date   • ANKLE SURGERY Right     pins and plate       Family History   Problem Relation Age of Onset   • No Known Problems Mother    • No Known Problems Father    • No Known Problems Brother          Medications have been verified.        Objective   /88   Pulse 103   Temp 98.8 °F (37.1 °C)   Resp 20   Ht 5' 11\" (1.803 m)   Wt (!) 170 kg (374 lb 9.6 oz)   SpO2 99%   BMI 52.25 kg/m²        Physical Exam     Physical Exam  Vitals and nursing note reviewed.   Constitutional:       General: He is not in acute distress.     Appearance: Normal appearance. He is normal weight. He is not ill-appearing.   HENT:      Head: Normocephalic and atraumatic.      Right Ear: Ear canal and external ear normal. Tympanic membrane is erythematous.      Left Ear: Tympanic membrane, ear canal and external ear normal.      Nose: Congestion and rhinorrhea present. Rhinorrhea is clear " and purulent.      Right Sinus: No maxillary sinus tenderness or frontal sinus tenderness.      Left Sinus: No maxillary sinus tenderness or frontal sinus tenderness.      Mouth/Throat:      Lips: Pink.      Mouth: Mucous membranes are moist.      Pharynx: Oropharynx is clear. Uvula midline. Posterior oropharyngeal erythema present. No pharyngeal swelling, oropharyngeal exudate or uvula swelling.      Tonsils: No tonsillar exudate or tonsillar abscesses. 0 on the right. 0 on the left.   Eyes:      Extraocular Movements: Extraocular movements intact.      Conjunctiva/sclera: Conjunctivae normal.      Pupils: Pupils are equal, round, and reactive to light.   Cardiovascular:      Rate and Rhythm: Normal rate and regular rhythm.      Pulses: Normal pulses.      Heart sounds: Normal heart sounds.   Pulmonary:      Effort: Pulmonary effort is normal.      Breath sounds: Normal breath sounds.   Abdominal:      General: Abdomen is flat. Bowel sounds are normal.      Palpations: Abdomen is soft.   Musculoskeletal:         General: Normal range of motion.      Cervical back: Full passive range of motion without pain, normal range of motion and neck supple.   Lymphadenopathy:      Cervical: No cervical adenopathy.   Skin:     General: Skin is warm and dry.      Capillary Refill: Capillary refill takes less than 2 seconds.      Findings: No rash.   Neurological:      General: No focal deficit present.      Mental Status: He is alert and oriented to person, place, and time.   Psychiatric:         Mood and Affect: Mood normal.         Behavior: Behavior normal.

## 2024-02-21 NOTE — LETTER
[address] [id number]  No information on file.    February 21, 2024    Patient: Christopher Moody  YOB: 1990    Christopher Moody was seen and evaluated at our [facility]. Please note if Covid and Flu tests are negative, they may return to work when fever free for 24 hours without the use of a fever reducing agent. If Covid or Flu test is positive, they may return to work on 02/26/2024, as this is 5 days from the onset of symptoms. Upon return, they must then adhere to strict masking for an additional 5 days.    Sincerely,        OKSANA Wood  COVID/Flu test pending

## 2024-02-22 LAB
FLUAV RNA RESP QL NAA+PROBE: NEGATIVE
FLUBV RNA RESP QL NAA+PROBE: NEGATIVE
SARS-COV-2 RNA RESP QL NAA+PROBE: NEGATIVE

## 2024-03-19 ENCOUNTER — APPOINTMENT (OUTPATIENT)
Dept: LAB | Facility: HOSPITAL | Age: 34
End: 2024-03-19
Payer: COMMERCIAL

## 2024-03-19 ENCOUNTER — OFFICE VISIT (OUTPATIENT)
Dept: LAB | Facility: HOSPITAL | Age: 34
End: 2024-03-19
Payer: COMMERCIAL

## 2024-03-19 DIAGNOSIS — R63.1 POLYDIPSIA: ICD-10-CM

## 2024-03-19 DIAGNOSIS — Z01.84 IMMUNITY STATUS TESTING: ICD-10-CM

## 2024-03-19 DIAGNOSIS — Z13.89 SCREENING FOR MULTIPLE CONDITIONS: ICD-10-CM

## 2024-03-19 DIAGNOSIS — R35.89 POLYURIA: ICD-10-CM

## 2024-03-19 DIAGNOSIS — R53.83 OTHER FATIGUE: ICD-10-CM

## 2024-03-19 DIAGNOSIS — R03.0 ELEVATED BLOOD PRESSURE READING: ICD-10-CM

## 2024-03-19 DIAGNOSIS — Z13.6 SCREENING FOR ISCHEMIC HEART DISEASE: ICD-10-CM

## 2024-03-19 LAB
ALBUMIN SERPL BCP-MCNC: 4 G/DL (ref 3.5–5)
ALP SERPL-CCNC: 83 U/L (ref 34–104)
ALT SERPL W P-5'-P-CCNC: 31 U/L (ref 7–52)
ANION GAP SERPL CALCULATED.3IONS-SCNC: 5 MMOL/L (ref 4–13)
AST SERPL W P-5'-P-CCNC: 21 U/L (ref 13–39)
ATRIAL RATE: 82 BPM
BASOPHILS # BLD AUTO: 0.02 THOUSANDS/ÂΜL (ref 0–0.1)
BASOPHILS NFR BLD AUTO: 0 % (ref 0–1)
BILIRUB SERPL-MCNC: 0.38 MG/DL (ref 0.2–1)
BUN SERPL-MCNC: 9 MG/DL (ref 5–25)
CALCIUM SERPL-MCNC: 9.2 MG/DL (ref 8.4–10.2)
CHLORIDE SERPL-SCNC: 102 MMOL/L (ref 96–108)
CHOLEST SERPL-MCNC: 171 MG/DL
CO2 SERPL-SCNC: 32 MMOL/L (ref 21–32)
CREAT SERPL-MCNC: 0.75 MG/DL (ref 0.6–1.3)
EOSINOPHIL # BLD AUTO: 0.1 THOUSAND/ÂΜL (ref 0–0.61)
EOSINOPHIL NFR BLD AUTO: 2 % (ref 0–6)
ERYTHROCYTE [DISTWIDTH] IN BLOOD BY AUTOMATED COUNT: 12.9 % (ref 11.6–15.1)
EST. AVERAGE GLUCOSE BLD GHB EST-MCNC: 123 MG/DL
GFR SERPL CREATININE-BSD FRML MDRD: 118 ML/MIN/1.73SQ M
GLUCOSE P FAST SERPL-MCNC: 93 MG/DL (ref 65–99)
HBA1C MFR BLD: 5.9 %
HBV SURFACE AB SER-ACNC: 49.4 MIU/ML
HCT VFR BLD AUTO: 45.4 % (ref 36.5–49.3)
HDLC SERPL-MCNC: 39 MG/DL
HGB BLD-MCNC: 14.7 G/DL (ref 12–17)
IMM GRANULOCYTES # BLD AUTO: 0.01 THOUSAND/UL (ref 0–0.2)
IMM GRANULOCYTES NFR BLD AUTO: 0 % (ref 0–2)
LDLC SERPL CALC-MCNC: 112 MG/DL (ref 0–100)
LYMPHOCYTES # BLD AUTO: 1.77 THOUSANDS/ÂΜL (ref 0.6–4.47)
LYMPHOCYTES NFR BLD AUTO: 32 % (ref 14–44)
MCH RBC QN AUTO: 30.8 PG (ref 26.8–34.3)
MCHC RBC AUTO-ENTMCNC: 32.4 G/DL (ref 31.4–37.4)
MCV RBC AUTO: 95 FL (ref 82–98)
MONOCYTES # BLD AUTO: 0.55 THOUSAND/ÂΜL (ref 0.17–1.22)
MONOCYTES NFR BLD AUTO: 10 % (ref 4–12)
NEUTROPHILS # BLD AUTO: 3.06 THOUSANDS/ÂΜL (ref 1.85–7.62)
NEUTS SEG NFR BLD AUTO: 56 % (ref 43–75)
NRBC BLD AUTO-RTO: 0 /100 WBCS
P AXIS: 45 DEGREES
PLATELET # BLD AUTO: 232 THOUSANDS/UL (ref 149–390)
PMV BLD AUTO: 10.7 FL (ref 8.9–12.7)
POTASSIUM SERPL-SCNC: 4.2 MMOL/L (ref 3.5–5.3)
PR INTERVAL: 140 MS
PROT SERPL-MCNC: 7.5 G/DL (ref 6.4–8.4)
QRS AXIS: -38 DEGREES
QRSD INTERVAL: 96 MS
QT INTERVAL: 368 MS
QTC INTERVAL: 429 MS
RBC # BLD AUTO: 4.77 MILLION/UL (ref 3.88–5.62)
SODIUM SERPL-SCNC: 139 MMOL/L (ref 135–147)
T WAVE AXIS: 12 DEGREES
T4 FREE SERPL-MCNC: 0.57 NG/DL (ref 0.61–1.12)
TRIGL SERPL-MCNC: 98 MG/DL
TSH SERPL DL<=0.05 MIU/L-ACNC: 7.97 UIU/ML (ref 0.45–4.5)
VENTRICULAR RATE: 82 BPM
WBC # BLD AUTO: 5.51 THOUSAND/UL (ref 4.31–10.16)

## 2024-03-19 PROCEDURE — 86706 HEP B SURFACE ANTIBODY: CPT

## 2024-03-19 PROCEDURE — 84439 ASSAY OF FREE THYROXINE: CPT

## 2024-03-19 PROCEDURE — 36415 COLL VENOUS BLD VENIPUNCTURE: CPT

## 2024-03-19 PROCEDURE — 93010 ELECTROCARDIOGRAM REPORT: CPT | Performed by: INTERNAL MEDICINE

## 2024-03-19 PROCEDURE — 93005 ELECTROCARDIOGRAM TRACING: CPT

## 2024-03-19 PROCEDURE — 83036 HEMOGLOBIN GLYCOSYLATED A1C: CPT

## 2024-03-19 PROCEDURE — 84443 ASSAY THYROID STIM HORMONE: CPT

## 2024-03-19 PROCEDURE — 80061 LIPID PANEL: CPT

## 2024-03-19 PROCEDURE — 85025 COMPLETE CBC W/AUTO DIFF WBC: CPT

## 2024-03-19 PROCEDURE — 80053 COMPREHEN METABOLIC PANEL: CPT

## 2024-03-20 ENCOUNTER — TELEPHONE (OUTPATIENT)
Dept: FAMILY MEDICINE CLINIC | Facility: CLINIC | Age: 34
End: 2024-03-20

## 2024-03-20 NOTE — TELEPHONE ENCOUNTER
03/20/24 5:40 PM     The office's request has been received, reviewed, and the patient chart updated. The PCP has successfully been removed with a patient attribution note. This message will now be completed.    Thank you  Rachel Kidd

## 2024-04-03 ENCOUNTER — OFFICE VISIT (OUTPATIENT)
Dept: URGENT CARE | Facility: MEDICAL CENTER | Age: 34
End: 2024-04-03
Payer: COMMERCIAL

## 2024-04-03 VITALS
OXYGEN SATURATION: 95 % | DIASTOLIC BLOOD PRESSURE: 74 MMHG | BODY MASS INDEX: 45.1 KG/M2 | HEIGHT: 70 IN | RESPIRATION RATE: 18 BRPM | WEIGHT: 315 LBS | HEART RATE: 107 BPM | SYSTOLIC BLOOD PRESSURE: 132 MMHG | TEMPERATURE: 97 F

## 2024-04-03 DIAGNOSIS — R19.7 DIARRHEA, UNSPECIFIED TYPE: Primary | ICD-10-CM

## 2024-04-03 PROCEDURE — 99212 OFFICE O/P EST SF 10 MIN: CPT

## 2024-04-03 NOTE — LETTER
April 3, 2024     Patient: Christopher Moody   YOB: 1990   Date of Visit: 4/3/2024       To Whom it May Concern:    Christopher Moody was seen in my clinic on 4/3/2024. He may return to work on 04/05/2024 .    If you have any questions or concerns, please don't hesitate to call.         Sincerely,          OKSANA Wood        CC: No Recipients

## 2024-04-03 NOTE — PROGRESS NOTES
[facility]        NAME: Christopher Moody is a 33 y.o. male  : 1990    MRN: [id number]  DATE: April 3, 2024  TIME: 10:22 AM    Assessment and Plan   Diarrhea, unspecified type [R19.7]  1. Diarrhea, unspecified type              Patient Instructions       Follow up with PCP in 3-5 days.  Proceed to  ER if symptoms worsen.    If tests are performed, our office will contact you with results only if changes need to made to the care plan discussed with you at the visit. You can review your full results on [facility].    Chief Complaint     Chief Complaint   Patient presents with   • Diarrhea     DIARRHEA X 3 DAYS NEEDS A WORK NOTE          History of Present Illness       Onset x3 days ago with diarrhea. Called off work today due to this. Stools are all liquid, yellow in nature. No abdominal pain but feeling bloated. No fevers. Nauseated without vomiting. He has continued to have flatus. Was out to eat last night at [employer] he feels like the food was not cooked properly as friends who were with them also reported food issues. He did have the diarrhea prior to eating this. He has had a decreased appetite overall but has been eating. No prior GI issues in the past. No prior abdominal surgeries. Follows with PCP regularly. He has had a cough recently which is non-productive. He has not had any CP/SOB. No recent out of state travel no recent antibiotic use.         Review of Systems   Review of Systems   Constitutional:  Positive for appetite change and fatigue. Negative for chills and fever.   HENT:  Negative for congestion, postnasal drip, rhinorrhea, sore throat and trouble swallowing.    Respiratory:  Positive for cough. Negative for shortness of breath.    Cardiovascular:  Negative for chest pain and palpitations.   Gastrointestinal:  Positive for diarrhea and nausea. Negative for abdominal pain, constipation and vomiting.   Skin:  Negative for color change and rash.   Neurological:  Negative  "for dizziness, light-headedness and headaches.         Current Medications       Current Outpatient Medications:   •  fluticasone (FLONASE) 50 mcg/act nasal spray, 1 spray into each nostril daily (Patient not taking: Reported on 4/3/2024), Disp: 11.1 mL, Rfl: 0  •  guaiFENesin (MUCINEX) 600 mg 12 hr tablet, Take 2 tablets (1,200 mg total) by mouth every 12 (twelve) hours (Patient not taking: Reported on 4/3/2024), Disp: 45 tablet, Rfl: 0    Current Allergies     Allergies as of 04/03/2024   • (No Known Allergies)            The following portions of the patient's history were reviewed and updated as appropriate: allergies, current medications, past family history, past medical history, past social history, past surgical history and problem list.     Past Medical History:   Diagnosis Date   • Asthma        Past Surgical History:   Procedure Laterality Date   • ANKLE SURGERY Right     pins and plate       Family History   Problem Relation Age of Onset   • No Known Problems Mother    • No Known Problems Father    • No Known Problems Brother          Medications have been verified.        Objective   /74   Pulse (!) 107   Temp (!) 97 °F (36.1 °C)   Resp 18   Ht 5' 10\" (1.778 m)   Wt (!) 170 kg (374 lb)   SpO2 95%   BMI 53.66 kg/m²        Physical Exam     Physical Exam  Vitals and nursing note reviewed.   Constitutional:       General: He is awake. He is not in acute distress.     Appearance: Normal appearance. He is morbidly obese. He is not ill-appearing.   HENT:      Head: Normocephalic and atraumatic.      Nose: Nose normal.      Mouth/Throat:      Lips: Pink.      Mouth: Mucous membranes are moist.      Pharynx: Oropharynx is clear.   Eyes:      Extraocular Movements: Extraocular movements intact.      Conjunctiva/sclera: Conjunctivae normal.      Pupils: Pupils are equal, round, and reactive to light.   Cardiovascular:      Rate and Rhythm: Normal rate and regular rhythm.      Pulses: Normal pulses. "      Heart sounds: Normal heart sounds.   Pulmonary:      Effort: Pulmonary effort is normal.      Breath sounds: Normal breath sounds.   Abdominal:      General: Abdomen is protuberant. Bowel sounds are increased.      Palpations: Abdomen is soft.      Tenderness: There is no abdominal tenderness.   Musculoskeletal:         General: Normal range of motion.      Cervical back: Full passive range of motion without pain, normal range of motion and neck supple.   Skin:     General: Skin is warm and dry.      Capillary Refill: Capillary refill takes less than 2 seconds.      Findings: No rash.   Neurological:      General: No focal deficit present.      Mental Status: He is alert and oriented to person, place, and time.   Psychiatric:         Mood and Affect: Mood normal.         Behavior: Behavior normal. Behavior is cooperative.

## 2024-04-03 NOTE — PATIENT INSTRUCTIONS
You may take over the counter Tylenol (Acetaminophen) and/or Motrin (Ibuprofen) as needed, as directed on packaging.   Be sure to get plenty of rest, and drinking fluids to remain hydrated.     Please follow up with your primary provider in the next several days. Should you have any worsening of symptoms, or lack of improvement please be re-evaluated. If needed for significant concerns, consider 911 or ER evaluation.     BRAT Diet  This is a guide to follow when choosing foods during a stomach illness. Eating bland foods like these for 24-48 hours will help get some sustenance in your stomach without likely irritating your stomach. Of course, if you are allergic, or unable to tolerate some of these foods you should avoid them, as this is only a guide. These type foods do not have a high nutrient value but are typically gentle on the stomach.    BRAT stands for Bananas, Rice, Apples/Apple Sauce and Toast.   However, these are not the only foods you can eat while following a BRAT diet. You may also eat: Dry Cereal, Saltine Crackers, Oatmeal, Plain Potatoes, & Broth soups.   Things you should AVOID: Dairy Products, Sugars, Fried or Spicy foods, Alcohol and caffeine.     You should always make sure you are also drinking plenty of fluids including: Water, Broth, Sports drinks, & diluted fruit juice.

## 2024-09-21 ENCOUNTER — OFFICE VISIT (OUTPATIENT)
Dept: URGENT CARE | Facility: MEDICAL CENTER | Age: 34
End: 2024-09-21
Payer: COMMERCIAL

## 2024-09-21 VITALS
BODY MASS INDEX: 55.1 KG/M2 | SYSTOLIC BLOOD PRESSURE: 128 MMHG | RESPIRATION RATE: 22 BRPM | HEART RATE: 87 BPM | WEIGHT: 315 LBS | TEMPERATURE: 97.2 F | OXYGEN SATURATION: 98 % | DIASTOLIC BLOOD PRESSURE: 92 MMHG

## 2024-09-21 DIAGNOSIS — R51.9 ACUTE NONINTRACTABLE HEADACHE, UNSPECIFIED HEADACHE TYPE: Primary | ICD-10-CM

## 2024-09-21 PROCEDURE — 99213 OFFICE O/P EST LOW 20 MIN: CPT

## 2024-09-21 NOTE — PROGRESS NOTES
[facility]        NAME: Christopher Moody is a 34 y.o. male  : 1990    MRN: [id number]  DATE: 2024  TIME: 4:11 PM    Assessment and Plan   Acute nonintractable headache, unspecified headache type [R51.9]  1. Acute nonintractable headache, unspecified headache type              Patient Instructions       Follow up with PCP in 3-5 days.  Proceed to  ER if symptoms worsen.    If tests are performed, our office will contact you with results only if changes need to made to the care plan discussed with you at the visit. [facility]    Chief Complaint     Chief Complaint   Patient presents with    Headache     At work was fine he had a hot flash and was clammy around 11am. 1130 had a headache fel like he was going to pass out. Took a tylenol 1500mg, ate a nutritional bar because he didnt have anything to eat since 900pm last night. He feels fine now. Was told if he left work he needs a doctors note.Left because her didn't want to take any chances. No other SX. No SOB or chest pain. 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 He took 1500mg of tylenol. He then realized he hasn't ate anything since 9pm last night and ate a nutritional [employer] bar. He reports he left work and was told if he left he had to get a doctors note to return. Patient denies any current symptoms since initial onset after eating the nutritional bar. He had a 20oz bottle of soda also today. Denies any SOB/CP, N/V. He does not have a Dx hx of diabetes. A1C in .9. Does not take anything for prediabetes. He is morbidly obese. He does have a family history of diabetes.         Review of Systems   Review of Systems   Constitutional:  Negative  for appetite change, chills, fatigue and fever.   Respiratory:  Negative for cough and shortness of breath.    Gastrointestinal:  Positive for nausea. Negative for abdominal pain, constipation, diarrhea and vomiting.   Musculoskeletal:  Negative for myalgias, neck pain and neck stiffness.   Skin:  Negative for color change and rash.   Neurological:  Positive for light-headedness and headaches. Negative for dizziness.         Current Medications       Current Outpatient Medications:     fluticasone (FLONASE) 50 mcg/act nasal spray, 1 spray into each nostril daily (Patient not taking: Reported on 4/3/2024), Disp: 11.1 mL, Rfl: 0    guaiFENesin (MUCINEX) 600 mg 12 hr tablet, Take 2 tablets (1,200 mg total) by mouth every 12 (twelve) hours (Patient not taking: Reported on 4/3/2024), Disp: 45 tablet, Rfl: 0    Current Allergies     Allergies as of 09/21/2024    (No Known Allergies)            The following portions of the patient's history were reviewed and updated as appropriate: allergies, current medications, past family history, past medical history, past social history, past surgical history and problem list.     Past Medical History:   Diagnosis Date    Asthma        Past Surgical History:   Procedure Laterality Date    ANKLE SURGERY Right     pins and plate       Family History   Problem Relation Age of Onset    No Known Problems Mother     No Known Problems Father     No Known Problems Brother          Medications have been verified.        Objective   /92   Pulse 87   Temp (!) 97.2 °F (36.2 °C)   Resp 22   Wt (!) 174 kg (384 lb)   SpO2 98%   BMI 55.10 kg/m²        Physical Exam     Physical Exam  Vitals and nursing note reviewed.   Constitutional:       General: He is awake. He is not in acute distress.     Appearance: Normal appearance. He is morbidly obese. He is not ill-appearing.   HENT:      Head: Normocephalic and atraumatic.      Right Ear: Tympanic membrane, ear canal and external ear normal.  Tympanic membrane is not erythematous or bulging.      Left Ear: Tympanic membrane is injected. Tympanic membrane is not erythematous or bulging.      Nose: Nose normal.      Mouth/Throat:      Lips: Pink.      Mouth: Mucous membranes are moist.      Pharynx: Oropharynx is clear.   Eyes:      Extraocular Movements: Extraocular movements intact.      Conjunctiva/sclera: Conjunctivae normal.      Pupils: Pupils are equal, round, and reactive to light.   Cardiovascular:      Rate and Rhythm: Normal rate and regular rhythm.      Pulses: Normal pulses.      Heart sounds: Normal heart sounds, S1 normal and S2 normal. No murmur heard.  Pulmonary:      Effort: Pulmonary effort is normal.      Breath sounds: Normal breath sounds.   Abdominal:      General: Abdomen is flat. Bowel sounds are normal.      Palpations: Abdomen is soft.   Musculoskeletal:         General: Normal range of motion.      Cervical back: Full passive range of motion without pain, normal range of motion and neck supple.      Right lower leg: No edema.      Left lower leg: No edema.   Skin:     General: Skin is warm and dry.      Capillary Refill: Capillary refill takes less than 2 seconds.      Findings: No rash.   Neurological:      General: No focal deficit present.      Mental Status: He is alert and oriented to person, place, and time.   Psychiatric:         Mood and Affect: Mood normal.         Behavior: Behavior normal. Behavior is cooperative.

## 2024-09-21 NOTE — LETTER
September 21, 2024     Patient: Christopher Moody   YOB: 1990   Date of Visit: 9/21/2024       To Whom it May Concern:    Christopher Moody was seen in my clinic on 9/21/2024. He may return to work on 09/22/2024 .    If you have any questions or concerns, please don't hesitate to call.         Sincerely,          OKSANA Wood        CC: No Recipients

## 2024-10-11 ENCOUNTER — HOSPITAL ENCOUNTER (EMERGENCY)
Facility: HOSPITAL | Age: 34
Discharge: HOME | End: 2024-10-11
Attending: EMERGENCY MEDICINE
Payer: OTHER MISCELLANEOUS

## 2024-10-11 ENCOUNTER — APPOINTMENT (EMERGENCY)
Dept: RADIOLOGY | Facility: HOSPITAL | Age: 34
End: 2024-10-11
Payer: OTHER MISCELLANEOUS

## 2024-10-11 VITALS
RESPIRATION RATE: 20 BRPM | DIASTOLIC BLOOD PRESSURE: 79 MMHG | HEART RATE: 98 BPM | TEMPERATURE: 97.8 F | SYSTOLIC BLOOD PRESSURE: 166 MMHG | BODY MASS INDEX: 38.74 KG/M2 | OXYGEN SATURATION: 96 % | WEIGHT: 270 LBS

## 2024-10-11 DIAGNOSIS — S63.502A SPRAIN OF LEFT WRIST, INITIAL ENCOUNTER: Primary | ICD-10-CM

## 2024-10-11 PROCEDURE — 73110 X-RAY EXAM OF WRIST: CPT

## 2024-10-11 PROCEDURE — 99283 EMERGENCY DEPT VISIT LOW MDM: CPT

## 2024-10-11 PROCEDURE — 99284 EMERGENCY DEPT VISIT MOD MDM: CPT | Performed by: EMERGENCY MEDICINE

## 2024-10-11 NOTE — ED PROVIDER NOTES
Time reflects when diagnosis was documented in both MDM as applicable and the Disposition within this note       Time User Action Codes Description Comment    10/11/2024 10:10 AM Tank Hayesel Add [S63.502A] Sprain of left wrist, initial encounter           ED Disposition       ED Disposition   Discharge    Condition   Stable    Date/Time   Fri Oct 11, 2024 10:10 AM    Comment   Christopher Moody discharge to home/self care.                   Assessment & Plan       Medical Decision Making  Possible wrist sprain as pain already improving per patient without any medications.  X-ray showing no acute fractures or dislocations.  Patient placed in a prefab static splint for comfort and recommend follow with primary care.    Amount and/or Complexity of Data Reviewed  Radiology: ordered.             Medications - No data to display    ED Risk Strat Scores                                               History of Present Illness       Chief Complaint   Patient presents with    Wrist Injury       Past Medical History:   Diagnosis Date    Asthma       Past Surgical History:   Procedure Laterality Date    ANKLE SURGERY Right     pins and plate      Family History   Problem Relation Age of Onset    No Known Problems Mother     No Known Problems Father     No Known Problems Brother       Social History     Tobacco Use    Smoking status: Never     Passive exposure: Never    Smokeless tobacco: Never   Vaping Use    Vaping status: Never Used   Substance Use Topics    Alcohol use: Not Currently     Comment: socially/ trying to have kids    Drug use: No      E-Cigarette/Vaping    E-Cigarette Use Never User       E-Cigarette/Vaping Substances    Nicotine No     THC No     CBD No     Flavoring No     Other No     Unknown No       I have reviewed and agree with the history as documented.     34-year-old male presenting from his job after stating he was trying to get down off a truck when he was pressing up with his left hand started to  have left wrist pain.  Denies hearing or feeling a pop however had pain with any rotation of the left wrist.  Denies falling or any injuries anywhere else.  States this just happened.  Denies any injury to the other arm.  Patient is right-hand dominant.  States the pain is been improving since this initially happened but he occasionally has some discomfort when he rotates the wrist but it is still better than before.  Denies having injuries like this in the past as far as he knows.          Review of Systems   Musculoskeletal:  Positive for arthralgias.   All other systems reviewed and are negative.          Objective       ED Triage Vitals [10/11/24 0956]   Temperature Pulse Blood Pressure Respirations SpO2 Patient Position - Orthostatic VS   97.8 °F (36.6 °C) 98 166/79 20 96 % Lying      Temp Source Heart Rate Source BP Location FiO2 (%) Pain Score    Temporal Monitor Left arm -- 1      Vitals      Date and Time Temp Pulse SpO2 Resp BP Pain Score FACES Pain Rating User   10/11/24 0956 97.8 °F (36.6 °C) 98 96 % 20 166/79 1 -- JCS            Physical Exam  Musculoskeletal:      Comments: Full range of motion of the left wrist.  Only with describes pain at extreme pronation of the wrist.  No pain with supination, flexion extension, ulnar or radial deviation.  No deformity, ecchymosis, erythema, swelling noted.  No tenderness noted to palpation of the hand or wrist or distal forearm.  Radial pulse 2+.  Sensation equal and intact with contralateral hand.  Cap refill less than 2 seconds.  Full range of motion without limitation noted.       General: VS reviewed  Appears in NAD  awake, alert.   Well-nourished, well-developed. Appears stated age.   Speaking normally in full sentences.   Head: Normocephalic, atraumatic  Eyes: EOM-I. No diplopia.   No hyphema.   No subconjunctival hemorrhages.  Symmetrical lids.   ENT: Atraumatic external nose and ears.    MMM  No malocclusion. No stridor. Normal phonation. No drooling.  Normal swallowing.   Neck: No JVD.  CV: No pallor noted  Lungs:   No tachypnea  No respiratory distress  MSK:   FROM spontaneously  Skin: Dry, intact.   Neuro: Awake, alert, GCS15, CN II-XII grossly intact.   Motor grossly intact.  Psychiatric/Behavioral: Appropriate mood and affect   Exam: deferred    Results Reviewed       None            XR wrist 3+ views LEFT    (Results Pending)       Procedures    ED Medication and Procedure Management   Prior to Admission Medications   Prescriptions Last Dose Informant Patient Reported? Taking?   fluticasone (FLONASE) 50 mcg/act nasal spray   No No   Si spray into each nostril daily   Patient not taking: Reported on 4/3/2024   guaiFENesin (MUCINEX) 600 mg 12 hr tablet   No No   Sig: Take 2 tablets (1,200 mg total) by mouth every 12 (twelve) hours   Patient not taking: Reported on 4/3/2024      Facility-Administered Medications: None     Patient's Medications   Discharge Prescriptions    No medications on file     No discharge procedures on file.  ED SEPSIS DOCUMENTATION   Time reflects when diagnosis was documented in both MDM as applicable and the Disposition within this note       Time User Action Codes Description Comment    10/11/2024 10:10 AM Vic Hayes Add [S63.502A] Sprain of left wrist, initial encounter                  Vic Hayes DO  10/11/24 1015

## 2024-10-15 ENCOUNTER — OFFICE VISIT (OUTPATIENT)
Dept: FAMILY MEDICINE CLINIC | Facility: CLINIC | Age: 34
End: 2024-10-15
Payer: COMMERCIAL

## 2024-10-15 VITALS
DIASTOLIC BLOOD PRESSURE: 88 MMHG | WEIGHT: 315 LBS | HEIGHT: 70 IN | HEART RATE: 102 BPM | OXYGEN SATURATION: 98 % | TEMPERATURE: 96.9 F | SYSTOLIC BLOOD PRESSURE: 138 MMHG | RESPIRATION RATE: 18 BRPM | BODY MASS INDEX: 45.1 KG/M2

## 2024-10-15 DIAGNOSIS — Z76.89 ENCOUNTER TO ESTABLISH CARE: Primary | ICD-10-CM

## 2024-10-15 DIAGNOSIS — G47.19 EXCESSIVE DAYTIME SLEEPINESS: ICD-10-CM

## 2024-10-15 DIAGNOSIS — G47.30 SLEEP-DISORDERED BREATHING: ICD-10-CM

## 2024-10-15 DIAGNOSIS — R06.83 SNORING: ICD-10-CM

## 2024-10-15 DIAGNOSIS — E03.9 HYPOTHYROIDISM, UNSPECIFIED TYPE: ICD-10-CM

## 2024-10-15 DIAGNOSIS — R73.03 PREDIABETES: ICD-10-CM

## 2024-10-15 LAB
EST. AVERAGE GLUCOSE BLD GHB EST-MCNC: 120 MG/DL
HBA1C MFR BLD: 5.8 %
TSH SERPL DL<=0.05 MIU/L-ACNC: 14.96 UIU/ML (ref 0.45–4.5)

## 2024-10-15 PROCEDURE — 99203 OFFICE O/P NEW LOW 30 MIN: CPT

## 2024-10-15 PROCEDURE — 83036 HEMOGLOBIN GLYCOSYLATED A1C: CPT

## 2024-10-15 PROCEDURE — 84443 ASSAY THYROID STIM HORMONE: CPT

## 2024-10-15 NOTE — PATIENT INSTRUCTIONS
Please get a blood pressure cuff from one of the local pharmacies and check your blood pressure in the morning while sitting comfortably in your arm supported on a desk at around your heart level.  Check it roughly 3 times a week, before breakfast and before any caffeine.  Write down the day that you took it and what the result was.  Bring that to us next appointment so we know what your blood pressures are at home.  Try to substitute iced tea for water or another nonsugary preferred beverage, you can use caffeine tablets as needed to make up for the loss caffeine from the ice tea.  Be on the look out for a phone call from the sleep department.   done

## 2024-10-15 NOTE — LETTER
October 15, 2024     Patient: Christopher Moody  YOB: 1990  Date of Visit: 10/15/2024      To Whom it May Concern:    Christopher Moody is under my professional care. Christopher CHUA was seen in my office on 10/15/2024. Christopher CHUA may return to work on 10/16/24 with no restrictions as his hand has sufficiently recovered to resume his work duties .    If you have any questions or concerns, please don't hesitate to call.         Sincerely,          Rui Martell MD

## 2024-10-15 NOTE — PROGRESS NOTES
"Ambulatory Visit  Name: Christopher Moody      : 1990      MRN: [id number]  Encounter Provider: Rui Martell MD  Encounter Date: 10/15/2024   Encounter department: [facility]    Patient's wrist doing well, return to work note given. BP elevated b/l arms, states former HTN diagnosis. Is willing to get BP cuff so recommended he does this to get home BP diary and bring to next apppointment in 2 weeks.  High risk for moderate-severe ZO per [facility], sleep referral ordered. Discussed need for sleep study given elevated BMI and  job among other risk factors. He is agreeable.  Had Pre-DM and hypothyroidism on previous labs, will repeat A1c and TSH to see trends and confirm hypothyroidism if present.  Assessment & Plan  Encounter to establish care         Prediabetes    Orders:    Hemoglobin A1C; Future    Hemoglobin A1C    Hypothyroidism, unspecified type    Orders:    TSH, 3rd generation; Future    TSH, 3rd generation    Sleep-disordered breathing    Orders:    Ambulatory Referral to Sleep Medicine; Future    Snoring    Orders:    Ambulatory Referral to Sleep Medicine; Future    Excessive daytime sleepiness    Orders:    Ambulatory Referral to Sleep Medicine; Future       History of Present Illness   HPI  CC: establish care and return to work note  Sprained L wrist 10/11/24 and ED X-ray showed no fractures. Says he is \"95%\" today. Normal sensation, strength and no pain per patient.  Wants to establish with us, was previously seen by [facility]  Hx: HTN in the past no medications currently, snoring without daytime fatigue BMI >55, asthma controlled w/o inhaler as of 3/18/24 [facility] appointment, prediabetes A1c 5.9% 3/19/24, hypothyroidism  Meds: vitamins  Allergies: no known drug allergies  Surgical: R ankle surgery snapped playing football, has hardware in foot  Family: Diabetes, heart disease, and others per patient but he does not remember  Immunizations: up to date childhood " "immunizations, flu shot declined  Social: . 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 No smoking or drugs, rare alcohol at most every other weekend 6-7 beers 12oz each.    Review of Systems   Constitutional:  Negative for chills and fever.   HENT:  Negative for congestion, postnasal drip, rhinorrhea and sinus pressure.    Eyes:  Negative for pain and visual disturbance.   Respiratory:  Negative for cough and shortness of breath.    Cardiovascular:  Negative for chest pain and palpitations.   Gastrointestinal:  Negative for abdominal pain, constipation, diarrhea, nausea and vomiting.   Musculoskeletal:  Negative for arthralgias, back pain and gait problem.   Skin:  Negative for color change and rash.   Neurological:  Negative for seizures and syncope.   Psychiatric/Behavioral:  Positive for sleep disturbance. Negative for agitation and confusion.    All other systems reviewed and are negative.          Objective   /88   Pulse 102   Temp (!) 96.9 °F (36.1 °C)   Resp 18   Ht 5' 10\" (1.778 m)   Wt (!) 178 kg (393 lb)   SpO2 98%   BMI 56.39 kg/m²   Repeat Bps similar b/l arms  Physical Exam  Vitals and nursing note reviewed.   Constitutional:       General: He is not in acute distress.     Appearance: He is well-developed.   HENT:      Head: Normocephalic and atraumatic.      Nose: No congestion or rhinorrhea.      Comments: Deviated septum  Eyes:      General: No scleral icterus.        Right eye: No discharge.         Left eye: No discharge.      Conjunctiva/sclera: Conjunctivae normal.   Neck:      Comments: Neck circumference 56cm  Cardiovascular:      Rate and Rhythm: Normal rate and regular rhythm.      Pulses: Normal pulses.      Heart sounds: Normal heart sounds. No murmur heard.     No friction rub. No gallop.   Pulmonary:      Effort: Pulmonary effort is normal. No respiratory distress.      Breath sounds: Normal breath sounds. No " stridor. No wheezing, rhonchi or rales.   Abdominal:      General: Bowel sounds are normal. There is no distension.      Palpations: Abdomen is soft. There is no mass.      Tenderness: There is no abdominal tenderness. There is no guarding or rebound.   Musculoskeletal:         General: No swelling.      Cervical back: Neck supple.   Skin:     General: Skin is warm and dry.      Capillary Refill: Capillary refill takes less than 2 seconds.   Neurological:      Mental Status: He is alert.      Comments: No facial droop, slurred speech or gross focal deficits noted

## 2024-10-25 ENCOUNTER — TRANSCRIBE ORDERS (OUTPATIENT)
Dept: SLEEP CENTER | Facility: CLINIC | Age: 34
End: 2024-10-25

## 2024-10-25 DIAGNOSIS — G47.19 EXCESSIVE DAYTIME SLEEPINESS: ICD-10-CM

## 2024-10-25 DIAGNOSIS — G47.30 SLEEP-DISORDERED BREATHING: Primary | ICD-10-CM

## 2024-10-25 DIAGNOSIS — R06.83 SNORING: ICD-10-CM

## 2024-10-30 ENCOUNTER — RA CDI HCC (OUTPATIENT)
Dept: OTHER | Facility: HOSPITAL | Age: 34
End: 2024-10-30

## 2025-04-05 ENCOUNTER — OCCMED (OUTPATIENT)
Dept: URGENT CARE | Facility: CLINIC | Age: 35
End: 2025-04-05

## 2025-04-05 DIAGNOSIS — Z02.89 ENCOUNTER FOR PHYSICAL EXAMINATION RELATED TO EMPLOYMENT: Primary | ICD-10-CM
